# Patient Record
Sex: FEMALE | Race: WHITE | NOT HISPANIC OR LATINO | URBAN - METROPOLITAN AREA
[De-identification: names, ages, dates, MRNs, and addresses within clinical notes are randomized per-mention and may not be internally consistent; named-entity substitution may affect disease eponyms.]

---

## 2017-06-04 ENCOUNTER — EMERGENCY (EMERGENCY)
Facility: HOSPITAL | Age: 67
LOS: 1 days | Discharge: HOME | End: 2017-06-04
Attending: EMERGENCY MEDICINE | Admitting: EMERGENCY MEDICINE
Payer: MEDICARE

## 2017-06-04 VITALS
DIASTOLIC BLOOD PRESSURE: 97 MMHG | SYSTOLIC BLOOD PRESSURE: 210 MMHG | HEIGHT: 64 IN | RESPIRATION RATE: 20 BRPM | OXYGEN SATURATION: 97 % | HEART RATE: 73 BPM | TEMPERATURE: 98 F | WEIGHT: 167.99 LBS

## 2017-06-04 LAB
ALBUMIN SERPL ELPH-MCNC: 4.5 G/DL — SIGNIFICANT CHANGE UP (ref 3.3–5)
ALP SERPL-CCNC: 61 U/L — SIGNIFICANT CHANGE UP (ref 40–120)
ALT FLD-CCNC: 15 U/L — SIGNIFICANT CHANGE UP (ref 4–33)
APPEARANCE UR: SIGNIFICANT CHANGE UP
AST SERPL-CCNC: 20 U/L — SIGNIFICANT CHANGE UP (ref 4–32)
BASOPHILS # BLD AUTO: 0.03 K/UL — SIGNIFICANT CHANGE UP (ref 0–0.2)
BASOPHILS NFR BLD AUTO: 0.5 % — SIGNIFICANT CHANGE UP (ref 0–2)
BILIRUB SERPL-MCNC: 0.6 MG/DL — SIGNIFICANT CHANGE UP (ref 0.2–1.2)
BILIRUB UR-MCNC: NEGATIVE — SIGNIFICANT CHANGE UP
BLOOD UR QL VISUAL: HIGH
BUN SERPL-MCNC: 26 MG/DL — HIGH (ref 7–23)
CALCIUM SERPL-MCNC: 9.8 MG/DL — SIGNIFICANT CHANGE UP (ref 8.4–10.5)
CHLORIDE SERPL-SCNC: 105 MMOL/L — SIGNIFICANT CHANGE UP (ref 98–107)
CO2 SERPL-SCNC: 27 MMOL/L — SIGNIFICANT CHANGE UP (ref 22–31)
COLOR SPEC: SIGNIFICANT CHANGE UP
CREAT SERPL-MCNC: 1.12 MG/DL — SIGNIFICANT CHANGE UP (ref 0.5–1.3)
EOSINOPHIL # BLD AUTO: 0.34 K/UL — SIGNIFICANT CHANGE UP (ref 0–0.5)
EOSINOPHIL NFR BLD AUTO: 5.4 % — SIGNIFICANT CHANGE UP (ref 0–6)
GLUCOSE SERPL-MCNC: 108 MG/DL — HIGH (ref 70–99)
GLUCOSE UR-MCNC: NEGATIVE — SIGNIFICANT CHANGE UP
HCT VFR BLD CALC: 42.7 % — SIGNIFICANT CHANGE UP (ref 34.5–45)
HGB BLD-MCNC: 14.1 G/DL — SIGNIFICANT CHANGE UP (ref 11.5–15.5)
IMM GRANULOCYTES NFR BLD AUTO: 0.3 % — SIGNIFICANT CHANGE UP (ref 0–1.5)
KETONES UR-MCNC: NEGATIVE — SIGNIFICANT CHANGE UP
LEUKOCYTE ESTERASE UR-ACNC: NEGATIVE — SIGNIFICANT CHANGE UP
LYMPHOCYTES # BLD AUTO: 1.13 K/UL — SIGNIFICANT CHANGE UP (ref 1–3.3)
LYMPHOCYTES # BLD AUTO: 17.8 % — SIGNIFICANT CHANGE UP (ref 13–44)
MCHC RBC-ENTMCNC: 29.4 PG — SIGNIFICANT CHANGE UP (ref 27–34)
MCHC RBC-ENTMCNC: 33 % — SIGNIFICANT CHANGE UP (ref 32–36)
MCV RBC AUTO: 89 FL — SIGNIFICANT CHANGE UP (ref 80–100)
MONOCYTES # BLD AUTO: 0.38 K/UL — SIGNIFICANT CHANGE UP (ref 0–0.9)
MONOCYTES NFR BLD AUTO: 6 % — SIGNIFICANT CHANGE UP (ref 2–14)
MUCOUS THREADS # UR AUTO: SIGNIFICANT CHANGE UP
NEUTROPHILS # BLD AUTO: 4.44 K/UL — SIGNIFICANT CHANGE UP (ref 1.8–7.4)
NEUTROPHILS NFR BLD AUTO: 70 % — SIGNIFICANT CHANGE UP (ref 43–77)
NITRITE UR-MCNC: NEGATIVE — SIGNIFICANT CHANGE UP
PH UR: 7 — SIGNIFICANT CHANGE UP (ref 4.6–8)
PLATELET # BLD AUTO: 156 K/UL — SIGNIFICANT CHANGE UP (ref 150–400)
PMV BLD: 12.3 FL — SIGNIFICANT CHANGE UP (ref 7–13)
POTASSIUM SERPL-MCNC: 3.9 MMOL/L — SIGNIFICANT CHANGE UP (ref 3.5–5.3)
POTASSIUM SERPL-SCNC: 3.9 MMOL/L — SIGNIFICANT CHANGE UP (ref 3.5–5.3)
PROT SERPL-MCNC: 7.6 G/DL — SIGNIFICANT CHANGE UP (ref 6–8.3)
PROT UR-MCNC: 20 — SIGNIFICANT CHANGE UP
RBC # BLD: 4.8 M/UL — SIGNIFICANT CHANGE UP (ref 3.8–5.2)
RBC # FLD: 13.5 % — SIGNIFICANT CHANGE UP (ref 10.3–14.5)
RBC CASTS # UR COMP ASSIST: SIGNIFICANT CHANGE UP (ref 0–?)
SODIUM SERPL-SCNC: 146 MMOL/L — HIGH (ref 135–145)
SP GR SPEC: 1.02 — SIGNIFICANT CHANGE UP (ref 1–1.03)
SQUAMOUS # UR AUTO: SIGNIFICANT CHANGE UP
UROBILINOGEN FLD QL: NORMAL E.U. — SIGNIFICANT CHANGE UP (ref 0.1–0.2)
WBC # BLD: 6.34 K/UL — SIGNIFICANT CHANGE UP (ref 3.8–10.5)
WBC # FLD AUTO: 6.34 K/UL — SIGNIFICANT CHANGE UP (ref 3.8–10.5)
WBC UR QL: SIGNIFICANT CHANGE UP (ref 0–?)

## 2017-06-04 PROCEDURE — 99285 EMERGENCY DEPT VISIT HI MDM: CPT | Mod: GC

## 2017-06-04 RX ORDER — ONDANSETRON 8 MG/1
4 TABLET, FILM COATED ORAL ONCE
Qty: 0 | Refills: 0 | Status: COMPLETED | OUTPATIENT
Start: 2017-06-04 | End: 2017-06-04

## 2017-06-04 RX ORDER — ACETAMINOPHEN 500 MG
1000 TABLET ORAL ONCE
Qty: 0 | Refills: 0 | Status: COMPLETED | OUTPATIENT
Start: 2017-06-04 | End: 2017-06-04

## 2017-06-04 RX ORDER — SODIUM CHLORIDE 9 MG/ML
1000 INJECTION INTRAMUSCULAR; INTRAVENOUS; SUBCUTANEOUS ONCE
Qty: 0 | Refills: 0 | Status: COMPLETED | OUTPATIENT
Start: 2017-06-04 | End: 2017-06-04

## 2017-06-04 RX ORDER — MORPHINE SULFATE 50 MG/1
4 CAPSULE, EXTENDED RELEASE ORAL ONCE
Qty: 0 | Refills: 0 | Status: DISCONTINUED | OUTPATIENT
Start: 2017-06-04 | End: 2017-06-04

## 2017-06-04 RX ADMIN — Medication 400 MILLIGRAM(S): at 22:00

## 2017-06-04 RX ADMIN — SODIUM CHLORIDE 4000 MILLILITER(S): 9 INJECTION INTRAMUSCULAR; INTRAVENOUS; SUBCUTANEOUS at 21:52

## 2017-06-04 RX ADMIN — Medication 1000 MILLIGRAM(S): at 22:21

## 2017-06-04 RX ADMIN — ONDANSETRON 4 MILLIGRAM(S): 8 TABLET, FILM COATED ORAL at 21:52

## 2017-06-04 RX ADMIN — MORPHINE SULFATE 4 MILLIGRAM(S): 50 CAPSULE, EXTENDED RELEASE ORAL at 22:21

## 2017-06-04 NOTE — ED ADULT TRIAGE NOTE - CHIEF COMPLAINT QUOTE
left flank pain    pt c/o left flank pain for 1 day, nausea, vomited x2 in triage. appers very uncomfortable in triage

## 2017-06-04 NOTE — ED PROVIDER NOTE - ATTENDING CONTRIBUTION TO CARE
I, Nancy Kan M.D. have examined the patient and confirmed the essential components of the history, physical examination, diagnosis, and treatment plan. I agree with the patient's care as documented by the resident and amended herein by me. See note above for complete details of service.  66 yo F Hx HTN, Renal Stones who pw L flank pain intermittent x 5 days. Pt has had one episode of hematuria since symptom onset. No fever. + Chills, nausea NBNB vomiting when symptoms worsen. On exam pt appears uncomfortable. Exam reveals + L CVAT, abdomen benign. Plan labs, UA, CT [place]. Symptom control. Supportive measures. Reassess.

## 2017-06-04 NOTE — ED ADULT NURSE NOTE - OBJECTIVE STATEMENT
Pt received in spot 13, A&Ox3, NAD.  c/o L flank pain starting yesterday with dysuria and increased pressure after urinating for approx 1 week.  Pt endorses nausea/vomiting.  Denies any other physical complaints.  Pt appears uncomfortable.  Labs/UA sent.  Will continue to monitor.

## 2017-06-04 NOTE — ED PROVIDER NOTE - OBJECTIVE STATEMENT
67 year old female with no significant past medical history in with left flank pain x 1 week, worsening, no alleviating or aggravating factors, associated with nausea, vomiting, dysuria, and one episode of hematuria 5 days ago that resolved.   Denies fever, abdominal pain.

## 2017-06-04 NOTE — ED PROVIDER NOTE - MEDICAL DECISION MAKING DETAILS
67 year old female in with flank pain, dysuria, cbc, cmp, iv fluids, iv tylenol, zofran, ct, reassess dc home, 67 year old female in with flank pain, dysuria, cbc, cmp, iv fluids, iv Tylenol, Zofran, ct, reassess dc home,

## 2017-06-05 VITALS
HEART RATE: 67 BPM | OXYGEN SATURATION: 100 % | DIASTOLIC BLOOD PRESSURE: 94 MMHG | TEMPERATURE: 98 F | SYSTOLIC BLOOD PRESSURE: 179 MMHG | RESPIRATION RATE: 16 BRPM

## 2017-06-05 PROCEDURE — 74176 CT ABD & PELVIS W/O CONTRAST: CPT | Mod: 26

## 2017-06-05 RX ORDER — OXYCODONE HYDROCHLORIDE 5 MG/1
1 TABLET ORAL
Qty: 8 | Refills: 0
Start: 2017-06-05 | End: 2017-06-07

## 2017-06-05 RX ORDER — OXYCODONE HYDROCHLORIDE 5 MG/1
1 TABLET ORAL
Qty: 15 | Refills: 0
Start: 2017-06-05

## 2017-06-05 RX ORDER — TAMSULOSIN HYDROCHLORIDE 0.4 MG/1
1 CAPSULE ORAL
Qty: 14 | Refills: 0
Start: 2017-06-05 | End: 2017-06-19

## 2017-06-05 RX ORDER — OXYCODONE HYDROCHLORIDE 5 MG/1
1 TABLET ORAL
Qty: 15 | Refills: 0 | OUTPATIENT
Start: 2017-06-05

## 2017-06-05 RX ADMIN — MORPHINE SULFATE 4 MILLIGRAM(S): 50 CAPSULE, EXTENDED RELEASE ORAL at 00:04

## 2017-06-05 NOTE — ED ADULT NURSE REASSESSMENT NOTE - NS ED NURSE REASSESS COMMENT FT1
rec'd pt. a&ox3, appears in NAD, breathes with ease, c/o mild Left lower back pain radiating to LLQ. denies any n/v. BP elevated, denies any h/a dizziness. MD made aware. NS infusing via g20 saline lock via left ac with no ss of infiltration. awaits results and dispo. son at bedside. will continue to monitor

## 2017-06-07 ENCOUNTER — MEDICATION RENEWAL (OUTPATIENT)
Age: 67
End: 2017-06-07

## 2017-07-26 ENCOUNTER — APPOINTMENT (OUTPATIENT)
Dept: INTERNAL MEDICINE | Facility: CLINIC | Age: 67
End: 2017-07-26

## 2017-07-26 DIAGNOSIS — E55.9 VITAMIN D DEFICIENCY, UNSPECIFIED: ICD-10-CM

## 2017-08-07 LAB
25(OH)D3 SERPL-MCNC: 55.2 NG/ML
ALBUMIN SERPL ELPH-MCNC: 4.4 G/DL
ALP BLD-CCNC: 62 U/L
ALT SERPL-CCNC: 17 U/L
ANION GAP SERPL CALC-SCNC: 13 MMOL/L
AST SERPL-CCNC: 17 U/L
BILIRUB SERPL-MCNC: 0.5 MG/DL
BUN SERPL-MCNC: 22 MG/DL
CALCIUM SERPL-MCNC: 9.9 MG/DL
CHLORIDE SERPL-SCNC: 103 MMOL/L
CO2 SERPL-SCNC: 28 MMOL/L
CREAT SERPL-MCNC: 0.77 MG/DL
GLUCOSE SERPL-MCNC: 86 MG/DL
POTASSIUM SERPL-SCNC: 4.3 MMOL/L
PROT SERPL-MCNC: 7.4 G/DL
SAVE SPECIMEN: NORMAL
SODIUM SERPL-SCNC: 144 MMOL/L

## 2017-08-09 ENCOUNTER — APPOINTMENT (OUTPATIENT)
Dept: INTERNAL MEDICINE | Facility: CLINIC | Age: 67
End: 2017-08-09
Payer: MEDICARE

## 2017-08-09 VITALS
WEIGHT: 167 LBS | HEIGHT: 64 IN | TEMPERATURE: 98.6 F | SYSTOLIC BLOOD PRESSURE: 130 MMHG | DIASTOLIC BLOOD PRESSURE: 84 MMHG | BODY MASS INDEX: 28.51 KG/M2

## 2017-08-09 PROCEDURE — 99213 OFFICE O/P EST LOW 20 MIN: CPT | Mod: 25

## 2017-08-09 PROCEDURE — 96372 THER/PROPH/DIAG INJ SC/IM: CPT

## 2019-01-28 ENCOUNTER — APPOINTMENT (OUTPATIENT)
Dept: INTERNAL MEDICINE | Facility: CLINIC | Age: 69
End: 2019-01-28

## 2019-01-30 ENCOUNTER — APPOINTMENT (OUTPATIENT)
Dept: INTERNAL MEDICINE | Facility: CLINIC | Age: 69
End: 2019-01-30
Payer: MEDICARE

## 2019-01-30 PROCEDURE — 36415 COLL VENOUS BLD VENIPUNCTURE: CPT

## 2019-01-31 LAB
25(OH)D3 SERPL-MCNC: 53.2 NG/ML
ALBUMIN SERPL ELPH-MCNC: 4.1 G/DL
ALP BLD-CCNC: 61 U/L
ALT SERPL-CCNC: 15 U/L
ANION GAP SERPL CALC-SCNC: 11 MMOL/L
AST SERPL-CCNC: 17 U/L
BILIRUB SERPL-MCNC: 0.6 MG/DL
BUN SERPL-MCNC: 22 MG/DL
CALCIUM SERPL-MCNC: 9.7 MG/DL
CHLORIDE SERPL-SCNC: 103 MMOL/L
CO2 SERPL-SCNC: 28 MMOL/L
CREAT SERPL-MCNC: 0.82 MG/DL
GLUCOSE SERPL-MCNC: 101 MG/DL
POTASSIUM SERPL-SCNC: 3.8 MMOL/L
PROT SERPL-MCNC: 6.6 G/DL
SAVE SPECIMEN: NORMAL
SODIUM SERPL-SCNC: 142 MMOL/L

## 2019-02-11 ENCOUNTER — APPOINTMENT (OUTPATIENT)
Dept: INTERNAL MEDICINE | Facility: CLINIC | Age: 69
End: 2019-02-11

## 2019-02-13 ENCOUNTER — APPOINTMENT (OUTPATIENT)
Dept: INTERNAL MEDICINE | Facility: CLINIC | Age: 69
End: 2019-02-13
Payer: MEDICARE

## 2019-02-13 VITALS
WEIGHT: 161 LBS | TEMPERATURE: 97.5 F | SYSTOLIC BLOOD PRESSURE: 130 MMHG | HEIGHT: 64 IN | BODY MASS INDEX: 27.49 KG/M2 | DIASTOLIC BLOOD PRESSURE: 70 MMHG

## 2019-02-13 PROCEDURE — 77080 DXA BONE DENSITY AXIAL: CPT | Mod: GA

## 2019-02-13 PROCEDURE — 99214 OFFICE O/P EST MOD 30 MIN: CPT | Mod: 25

## 2019-02-13 PROCEDURE — 96372 THER/PROPH/DIAG INJ SC/IM: CPT

## 2019-02-13 RX ORDER — DENOSUMAB 60 MG/ML
60 INJECTION SUBCUTANEOUS
Qty: 1 | Refills: 0 | Status: COMPLETED | OUTPATIENT
Start: 2019-02-13

## 2019-02-13 RX ADMIN — DENOSUMAB 0 MG/ML: 60 INJECTION SUBCUTANEOUS at 00:00

## 2019-02-13 NOTE — DATA REVIEWED
[FreeTextEntry1] : DEXA today revealed T scores were\par -2.7 in Lspine and -2.9 in femoral neck of the hips\par in comparison the T scores were\par -2.7 and -2.5 respectively in 7/27/2017

## 2019-02-13 NOTE — PHYSICAL EXAM
[No Acute Distress] : no acute distress [Well Nourished] : well nourished [Well Developed] : well developed [Well-Appearing] : well-appearing [No CVA Tenderness] : no CVA  tenderness [No Spinal Tenderness] : no spinal tenderness [No Joint Swelling] : no joint swelling [Grossly Normal Strength/Tone] : grossly normal strength/tone [Normal Gait] : normal gait [Coordination Grossly Intact] : coordination grossly intact [Normal Affect] : the affect was normal [Normal Mood] : the mood was normal

## 2019-02-13 NOTE — HISTORY OF PRESENT ILLNESS
[FreeTextEntry1] : pt presents to discuss results of recent DEXA  for f/u for treatment options for osteoporosis and  her 6th Prolia shot.\par She did not have any treatment for osteoporosis in 2018

## 2019-08-21 DIAGNOSIS — Z00.00 ENCOUNTER FOR GENERAL ADULT MEDICAL EXAMINATION W/OUT ABNORMAL FINDINGS: ICD-10-CM

## 2019-08-28 ENCOUNTER — APPOINTMENT (OUTPATIENT)
Dept: INTERNAL MEDICINE | Facility: CLINIC | Age: 69
End: 2019-08-28
Payer: MEDICARE

## 2019-08-28 PROCEDURE — 36415 COLL VENOUS BLD VENIPUNCTURE: CPT

## 2019-08-29 LAB
ALBUMIN SERPL ELPH-MCNC: 4.1 G/DL
ALP BLD-CCNC: 52 U/L
ALT SERPL-CCNC: 15 U/L
ANION GAP SERPL CALC-SCNC: 11 MMOL/L
AST SERPL-CCNC: 17 U/L
BILIRUB SERPL-MCNC: 0.6 MG/DL
BUN SERPL-MCNC: 21 MG/DL
CALCIUM SERPL-MCNC: 9.6 MG/DL
CHLORIDE SERPL-SCNC: 102 MMOL/L
CO2 SERPL-SCNC: 28 MMOL/L
CREAT SERPL-MCNC: 0.66 MG/DL
GLUCOSE SERPL-MCNC: 104 MG/DL
POTASSIUM SERPL-SCNC: 3.7 MMOL/L
PROT SERPL-MCNC: 6.6 G/DL
SAVE SPECIMEN: NORMAL
SODIUM SERPL-SCNC: 141 MMOL/L

## 2019-09-13 ENCOUNTER — APPOINTMENT (OUTPATIENT)
Dept: INTERNAL MEDICINE | Facility: CLINIC | Age: 69
End: 2019-09-13
Payer: MEDICARE

## 2019-09-13 VITALS
WEIGHT: 165 LBS | BODY MASS INDEX: 28.17 KG/M2 | HEIGHT: 64 IN | SYSTOLIC BLOOD PRESSURE: 158 MMHG | TEMPERATURE: 97.3 F | DIASTOLIC BLOOD PRESSURE: 86 MMHG

## 2019-09-13 DIAGNOSIS — Z09 ENCOUNTER FOR FOLLOW-UP EXAMINATION AFTER COMPLETED TREATMENT FOR CONDITIONS OTHER THAN MALIGNANT NEOPLASM: ICD-10-CM

## 2019-09-13 DIAGNOSIS — M81.0 ENCOUNTER FOR FOLLOW-UP EXAMINATION AFTER COMPLETED TREATMENT FOR CONDITIONS OTHER THAN MALIGNANT NEOPLASM: ICD-10-CM

## 2019-09-13 PROCEDURE — 96372 THER/PROPH/DIAG INJ SC/IM: CPT

## 2020-02-27 ENCOUNTER — APPOINTMENT (OUTPATIENT)
Dept: ORTHOPEDIC SURGERY | Facility: CLINIC | Age: 70
End: 2020-02-27
Payer: MEDICARE

## 2020-02-27 DIAGNOSIS — M75.21 BICIPITAL TENDINITIS, RIGHT SHOULDER: ICD-10-CM

## 2020-02-27 DIAGNOSIS — S46.019A STRAIN OF MUSCLE(S) AND TENDON(S) OF THE ROTATOR CUFF OF UNSPECIFIED SHOULDER, INITIAL ENCOUNTER: ICD-10-CM

## 2020-02-27 DIAGNOSIS — Z80.9 FAMILY HISTORY OF MALIGNANT NEOPLASM, UNSPECIFIED: ICD-10-CM

## 2020-02-27 DIAGNOSIS — Z87.39 PERSONAL HISTORY OF OTHER DISEASES OF THE MUSCULOSKELETAL SYSTEM AND CONNECTIVE TISSUE: ICD-10-CM

## 2020-02-27 DIAGNOSIS — Z86.79 PERSONAL HISTORY OF OTHER DISEASES OF THE CIRCULATORY SYSTEM: ICD-10-CM

## 2020-02-27 DIAGNOSIS — Z82.62 FAMILY HISTORY OF OSTEOPOROSIS: ICD-10-CM

## 2020-02-27 PROCEDURE — 99203 OFFICE O/P NEW LOW 30 MIN: CPT

## 2020-02-27 RX ORDER — PREDNISONE 20 MG/1
20 TABLET ORAL
Qty: 14 | Refills: 0 | Status: DISCONTINUED | COMMUNITY
Start: 2019-11-27

## 2020-02-27 RX ORDER — METHYLPREDNISOLONE 4 MG/1
4 TABLET ORAL
Qty: 21 | Refills: 0 | Status: DISCONTINUED | COMMUNITY
Start: 2019-12-20

## 2020-02-27 RX ORDER — HYDROCHLOROTHIAZIDE 25 MG/1
25 TABLET ORAL
Qty: 30 | Refills: 0 | Status: ACTIVE | COMMUNITY
Start: 2019-10-30

## 2020-02-27 RX ORDER — SODIUM SULFATE, POTASSIUM SULFATE, MAGNESIUM SULFATE 17.5; 3.13; 1.6 G/ML; G/ML; G/ML
17.5-3.13-1.6 SOLUTION, CONCENTRATE ORAL
Qty: 354 | Refills: 0 | Status: DISCONTINUED | COMMUNITY
Start: 2019-10-11

## 2020-03-03 NOTE — ADDENDUM
[FreeTextEntry1] : I, Katie Georges wrote this note acting as a scribe for Dr. Hardeep Millard on Feb 27, 2020.

## 2020-03-03 NOTE — PHYSICAL EXAM
[de-identified] : Patient is WDWN, alert, and in no acute distress. Breathing is unlabored. She is grossly oriented to person, place, and time. \par \par Right Shoulder: \par Inspection/ Palpation: there is no tenderness, swelling, or deformities. \par Range of Motion: active flexion, abduction, internal and external rotation are full and painless in all planes with no crepitus.\par Strength: forward elevation, internal rotation, external rotation, adduction, and abduction are 5/5. \par Stability: no joint instability on provocative testing. \par \par Right hand: There is A1 pulley tenderness in the long finger. Full arc of motion in the fingers, and all intrinsic and extrinsic hand muscles 5/5. No joint instability on provocative testing, sensation is intact to light touch, and no skin lesions or discoloration.  [de-identified] : MRI right shoulder on 02/19/2020 at [facility]: Acute rotator cuff tendon rupture with completely rupture subscapularis, essentially complete disruption supraspinatus and high-grade incomplete tearing infraspinatus. Concomitant anterior glenohumeral capsular rupture. Joint effusion with extension to the subacromial subdeltoid bursa. High-grade tearing of the long head biceps. \par \par No acute fracture. Mild to moderate acromioclavicular and mild glenohumeral degenerative chondrosis.

## 2020-03-03 NOTE — END OF VISIT
[FreeTextEntry3] : I, Hardeep Millard MD, ordering physician, have read and attest that all the information, medical decision making and discharge instructions within are true and accurate.

## 2020-04-20 ENCOUNTER — APPOINTMENT (OUTPATIENT)
Dept: NEUROLOGY | Facility: CLINIC | Age: 70
End: 2020-04-20

## 2020-08-31 ENCOUNTER — APPOINTMENT (OUTPATIENT)
Dept: ORTHOPEDIC SURGERY | Facility: CLINIC | Age: 70
End: 2020-08-31
Payer: MEDICARE

## 2020-08-31 DIAGNOSIS — M23.91 UNSPECIFIED INTERNAL DERANGEMENT OF RIGHT KNEE: ICD-10-CM

## 2020-08-31 PROCEDURE — 99213 OFFICE O/P EST LOW 20 MIN: CPT

## 2020-08-31 NOTE — PHYSICAL EXAM
[de-identified] : Patient is WDWN, alert, and in no acute distress. Breathing is unlabored. She is grossly oriented to person, place, and time. \par \par Right Shoulder: \par Inspection/ Palpation: there is diffuse tenderness, mild swelling, no deformities. \par Range of Motion: active flexion, abduction, internal and external rotation are full and painless in all planes with mild crepitus.\par Strength: forward elevation, internal rotation, external rotation, adduction, and abduction are 5/5. \par Stability: no joint instability on provocative testing. \par \par   [de-identified] : MRI right shoulder on 02/19/2020 at [facility]: Acute rotator cuff tendon rupture with completely rupture subscapularis, essentially complete disruption supraspinatus and high-grade incomplete tearing infraspinatus. Concomitant anterior glenohumeral capsular rupture. Joint effusion with extension to the subacromial subdeltoid bursa. High-grade tearing of the long head biceps. \par \par No acute fracture. Mild to moderate acromioclavicular and mild glenohumeral degenerative chondrosis. \par \par Xray of the right knee obtained at [facility] on 08/27/2020 were reviewed and interpreted in office today 08/31/2020 by Dr. Millard and revealed that there is no fracture or malalignment of the right knee. Mild tricompartmental osteoarthritis. 1.1 cm calcific body projecting over the medial femoral condylar margin, which may represent a loose body within the medial gutter. Small nonspecific knee joint effusion. superior patellar enthesopathic change. Regional soft tissues within normal limits. Signed by Attending Ave Pereira MD. \par \par \par MRI of the right knee obtained at [facility] on 08/28/2020 were reviewed and interpreted in office today 08/31/2020 by Dr. Millard and revealed lateral meniscus body and anterior horn complex tear with predominant horizontal component. No parameniscal cyst. Tricompartmental articular cartilage degeneration, most prominent in the lateral and patellofemoral compartments. Moderate knee joint effusion mild synovitis. Moderate sized popliteal cyst measuring up to 5.7 cm with internal septation debris. Evidence of ruptured popliteal cyst characterized by fluid seen tracking superficial to the medial head of the gastrocnemius muscle. 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 Correlation can be made for point tenderness in this area. Signed by Attending Erlinda Smart MD. \par

## 2020-08-31 NOTE — ADDENDUM
[FreeTextEntry1] : I, Talita Pugh wrote this note acting as a scribe for Dr. Hardeep Millard on Aug 31, 2020.

## 2020-08-31 NOTE — DISCUSSION/SUMMARY
[de-identified] : The patient wishes to proceed with physical therapy for the right knee. A script was given. \par The option for a cortisone injection was discussed. \par Patient was advised that there is no need for surgery. \par Topical analgesics as needed. \par NSAIDs as tolerated. \par Walking and physical activity were encouraged. \par Follow Up in 6 weeks. \par

## 2020-08-31 NOTE — HISTORY OF PRESENT ILLNESS
[de-identified] : Patient is a 70 year old female who presents with right knee pain. She states that she was carrying heavy objects when she was going up and down the stairs when she began to fell pain. Denies injury. She states that she feels pain around her knee. \par

## 2020-11-23 ENCOUNTER — INPATIENT (INPATIENT)
Facility: HOSPITAL | Age: 70
LOS: 0 days | Discharge: HOME | End: 2020-11-24
Attending: INTERNAL MEDICINE | Admitting: INTERNAL MEDICINE
Payer: MEDICARE

## 2020-11-23 VITALS
TEMPERATURE: 98 F | HEIGHT: 64 IN | WEIGHT: 158.73 LBS | SYSTOLIC BLOOD PRESSURE: 181 MMHG | OXYGEN SATURATION: 100 % | RESPIRATION RATE: 16 BRPM | HEART RATE: 82 BPM | DIASTOLIC BLOOD PRESSURE: 101 MMHG

## 2020-11-23 DIAGNOSIS — C49.A0 GASTROINTESTINAL STROMAL TUMOR, UNSPECIFIED SITE: ICD-10-CM

## 2020-11-23 DIAGNOSIS — Z29.9 ENCOUNTER FOR PROPHYLACTIC MEASURES, UNSPECIFIED: ICD-10-CM

## 2020-11-23 DIAGNOSIS — I10 ESSENTIAL (PRIMARY) HYPERTENSION: ICD-10-CM

## 2020-11-23 DIAGNOSIS — I48.91 UNSPECIFIED ATRIAL FIBRILLATION: ICD-10-CM

## 2020-11-23 DIAGNOSIS — F41.9 ANXIETY DISORDER, UNSPECIFIED: ICD-10-CM

## 2020-11-23 DIAGNOSIS — R07.9 CHEST PAIN, UNSPECIFIED: ICD-10-CM

## 2020-11-23 LAB
ALBUMIN SERPL ELPH-MCNC: 4.5 G/DL — SIGNIFICANT CHANGE UP (ref 3.3–5)
ALP SERPL-CCNC: 69 U/L — SIGNIFICANT CHANGE UP (ref 40–120)
ALT FLD-CCNC: 14 U/L — SIGNIFICANT CHANGE UP (ref 4–33)
ANION GAP SERPL CALC-SCNC: 11 MMO/L — SIGNIFICANT CHANGE UP (ref 7–14)
APTT BLD: 30.1 SEC — SIGNIFICANT CHANGE UP (ref 27–36.3)
APTT BLD: > 200 SEC — CRITICAL HIGH (ref 27–36.3)
AST SERPL-CCNC: 20 U/L — SIGNIFICANT CHANGE UP (ref 4–32)
BASOPHILS # BLD AUTO: 0.05 K/UL — SIGNIFICANT CHANGE UP (ref 0–0.2)
BASOPHILS NFR BLD AUTO: 0.8 % — SIGNIFICANT CHANGE UP (ref 0–2)
BILIRUB SERPL-MCNC: 0.9 MG/DL — SIGNIFICANT CHANGE UP (ref 0.2–1.2)
BUN SERPL-MCNC: 14 MG/DL — SIGNIFICANT CHANGE UP (ref 7–23)
CALCIUM SERPL-MCNC: 10.2 MG/DL — SIGNIFICANT CHANGE UP (ref 8.4–10.5)
CHLORIDE SERPL-SCNC: 103 MMOL/L — SIGNIFICANT CHANGE UP (ref 98–107)
CK SERPL-CCNC: 48 U/L — SIGNIFICANT CHANGE UP (ref 25–170)
CO2 SERPL-SCNC: 27 MMOL/L — SIGNIFICANT CHANGE UP (ref 22–31)
CREAT SERPL-MCNC: 0.57 MG/DL — SIGNIFICANT CHANGE UP (ref 0.5–1.3)
EOSINOPHIL # BLD AUTO: 0.19 K/UL — SIGNIFICANT CHANGE UP (ref 0–0.5)
EOSINOPHIL NFR BLD AUTO: 3 % — SIGNIFICANT CHANGE UP (ref 0–6)
GLUCOSE SERPL-MCNC: 109 MG/DL — HIGH (ref 70–99)
HCT VFR BLD CALC: 40.5 % — SIGNIFICANT CHANGE UP (ref 34.5–45)
HCT VFR BLD CALC: 44.1 % — SIGNIFICANT CHANGE UP (ref 34.5–45)
HGB BLD-MCNC: 13.7 G/DL — SIGNIFICANT CHANGE UP (ref 11.5–15.5)
HGB BLD-MCNC: 14.9 G/DL — SIGNIFICANT CHANGE UP (ref 11.5–15.5)
IMM GRANULOCYTES NFR BLD AUTO: 0.5 % — SIGNIFICANT CHANGE UP (ref 0–1.5)
INR BLD: 0.97 — SIGNIFICANT CHANGE UP (ref 0.88–1.16)
LYMPHOCYTES # BLD AUTO: 1.02 K/UL — SIGNIFICANT CHANGE UP (ref 1–3.3)
LYMPHOCYTES # BLD AUTO: 15.9 % — SIGNIFICANT CHANGE UP (ref 13–44)
MCHC RBC-ENTMCNC: 29.2 PG — SIGNIFICANT CHANGE UP (ref 27–34)
MCHC RBC-ENTMCNC: 29.4 PG — SIGNIFICANT CHANGE UP (ref 27–34)
MCHC RBC-ENTMCNC: 33.8 % — SIGNIFICANT CHANGE UP (ref 32–36)
MCHC RBC-ENTMCNC: 33.8 % — SIGNIFICANT CHANGE UP (ref 32–36)
MCV RBC AUTO: 86.4 FL — SIGNIFICANT CHANGE UP (ref 80–100)
MCV RBC AUTO: 87 FL — SIGNIFICANT CHANGE UP (ref 80–100)
MONOCYTES # BLD AUTO: 0.3 K/UL — SIGNIFICANT CHANGE UP (ref 0–0.9)
MONOCYTES NFR BLD AUTO: 4.7 % — SIGNIFICANT CHANGE UP (ref 2–14)
NEUTROPHILS # BLD AUTO: 4.83 K/UL — SIGNIFICANT CHANGE UP (ref 1.8–7.4)
NEUTROPHILS NFR BLD AUTO: 75.1 % — SIGNIFICANT CHANGE UP (ref 43–77)
NRBC # FLD: 0 K/UL — SIGNIFICANT CHANGE UP (ref 0–0)
NRBC # FLD: 0 K/UL — SIGNIFICANT CHANGE UP (ref 0–0)
PLATELET # BLD AUTO: 137 K/UL — LOW (ref 150–400)
PLATELET # BLD AUTO: 143 K/UL — LOW (ref 150–400)
PMV BLD: 12.2 FL — SIGNIFICANT CHANGE UP (ref 7–13)
PMV BLD: 12.4 FL — SIGNIFICANT CHANGE UP (ref 7–13)
POTASSIUM SERPL-MCNC: 3.5 MMOL/L — SIGNIFICANT CHANGE UP (ref 3.5–5.3)
POTASSIUM SERPL-SCNC: 3.5 MMOL/L — SIGNIFICANT CHANGE UP (ref 3.5–5.3)
PROT SERPL-MCNC: 7.1 G/DL — SIGNIFICANT CHANGE UP (ref 6–8.3)
PROTHROM AB SERPL-ACNC: 11.2 SEC — SIGNIFICANT CHANGE UP (ref 10.6–13.6)
RBC # BLD: 4.69 M/UL — SIGNIFICANT CHANGE UP (ref 3.8–5.2)
RBC # BLD: 5.07 M/UL — SIGNIFICANT CHANGE UP (ref 3.8–5.2)
RBC # FLD: 13.3 % — SIGNIFICANT CHANGE UP (ref 10.3–14.5)
RBC # FLD: 13.5 % — SIGNIFICANT CHANGE UP (ref 10.3–14.5)
SARS-COV-2 RNA SPEC QL NAA+PROBE: SIGNIFICANT CHANGE UP
SODIUM SERPL-SCNC: 141 MMOL/L — SIGNIFICANT CHANGE UP (ref 135–145)
TROPONIN T, HIGH SENSITIVITY: 50 NG/L — SIGNIFICANT CHANGE UP (ref ?–14)
TROPONIN T, HIGH SENSITIVITY: 68 NG/L — CRITICAL HIGH (ref ?–14)
TROPONIN T, HIGH SENSITIVITY: 70 NG/L — CRITICAL HIGH (ref ?–14)
WBC # BLD: 5.65 K/UL — SIGNIFICANT CHANGE UP (ref 3.8–10.5)
WBC # BLD: 6.42 K/UL — SIGNIFICANT CHANGE UP (ref 3.8–10.5)
WBC # FLD AUTO: 5.65 K/UL — SIGNIFICANT CHANGE UP (ref 3.8–10.5)
WBC # FLD AUTO: 6.42 K/UL — SIGNIFICANT CHANGE UP (ref 3.8–10.5)

## 2020-11-23 PROCEDURE — 99223 1ST HOSP IP/OBS HIGH 75: CPT

## 2020-11-23 PROCEDURE — 71046 X-RAY EXAM CHEST 2 VIEWS: CPT | Mod: 26

## 2020-11-23 PROCEDURE — 99284 EMERGENCY DEPT VISIT MOD MDM: CPT

## 2020-11-23 RX ORDER — HEPARIN SODIUM 5000 [USP'U]/ML
3000 INJECTION INTRAVENOUS; SUBCUTANEOUS EVERY 6 HOURS
Refills: 0 | Status: DISCONTINUED | OUTPATIENT
Start: 2020-11-23 | End: 2020-11-24

## 2020-11-23 RX ORDER — INFLUENZA VIRUS VACCINE 15; 15; 15; 15 UG/.5ML; UG/.5ML; UG/.5ML; UG/.5ML
0.5 SUSPENSION INTRAMUSCULAR ONCE
Refills: 0 | Status: DISCONTINUED | OUTPATIENT
Start: 2020-11-23 | End: 2020-11-24

## 2020-11-23 RX ORDER — LOSARTAN POTASSIUM 100 MG/1
1 TABLET, FILM COATED ORAL
Qty: 0 | Refills: 0 | DISCHARGE

## 2020-11-23 RX ORDER — ACETAMINOPHEN 500 MG
650 TABLET ORAL ONCE
Refills: 0 | Status: COMPLETED | OUTPATIENT
Start: 2020-11-23 | End: 2020-11-23

## 2020-11-23 RX ORDER — HEPARIN SODIUM 5000 [USP'U]/ML
6000 INJECTION INTRAVENOUS; SUBCUTANEOUS EVERY 6 HOURS
Refills: 0 | Status: DISCONTINUED | OUTPATIENT
Start: 2020-11-23 | End: 2020-11-24

## 2020-11-23 RX ORDER — ACETAMINOPHEN 500 MG
650 TABLET ORAL EVERY 6 HOURS
Refills: 0 | Status: DISCONTINUED | OUTPATIENT
Start: 2020-11-23 | End: 2020-11-24

## 2020-11-23 RX ORDER — LOSARTAN POTASSIUM 100 MG/1
100 TABLET, FILM COATED ORAL DAILY
Refills: 0 | Status: DISCONTINUED | OUTPATIENT
Start: 2020-11-23 | End: 2020-11-24

## 2020-11-23 RX ORDER — HEPARIN SODIUM 5000 [USP'U]/ML
INJECTION INTRAVENOUS; SUBCUTANEOUS
Qty: 25000 | Refills: 0 | Status: DISCONTINUED | OUTPATIENT
Start: 2020-11-23 | End: 2020-11-24

## 2020-11-23 RX ORDER — HEPARIN SODIUM 5000 [USP'U]/ML
6000 INJECTION INTRAVENOUS; SUBCUTANEOUS ONCE
Refills: 0 | Status: COMPLETED | OUTPATIENT
Start: 2020-11-23 | End: 2020-11-23

## 2020-11-23 RX ADMIN — Medication 650 MILLIGRAM(S): at 21:45

## 2020-11-23 RX ADMIN — HEPARIN SODIUM 6000 UNIT(S): 5000 INJECTION INTRAVENOUS; SUBCUTANEOUS at 15:53

## 2020-11-23 RX ADMIN — HEPARIN SODIUM 1100 UNIT(S)/HR: 5000 INJECTION INTRAVENOUS; SUBCUTANEOUS at 23:27

## 2020-11-23 RX ADMIN — HEPARIN SODIUM 0 UNIT(S)/HR: 5000 INJECTION INTRAVENOUS; SUBCUTANEOUS at 22:24

## 2020-11-23 RX ADMIN — Medication 650 MILLIGRAM(S): at 22:45

## 2020-11-23 RX ADMIN — HEPARIN SODIUM 1300 UNIT(S)/HR: 5000 INJECTION INTRAVENOUS; SUBCUTANEOUS at 15:53

## 2020-11-23 NOTE — ED PROVIDER NOTE - OBJECTIVE STATEMENT
70F with PMH of HTN p/w sub-sternal chest pain with radiation to the left arm and jaw. Associated with tingling in the left arm. No history of heart problems or similar symptoms. Has been in her usual state of health recently. Now mostly resolved. Noted to be in new Afib on arrival to ED.

## 2020-11-23 NOTE — CONSULT NOTE ADULT - SUBJECTIVE AND OBJECTIVE BOX
CHIEF COMPLAINT:  chest pain palpitations afib rapid VR  HISTORY OF PRESENT ILLNESS:  HPI:  The patient is a 71 yo woman with PMH of GIST, osteoperosis, HTN presenting with chest pain and new onset atrial fibrillation. Patient reports being in usual state of health last night. This morning around 830 AM developed palpitations followed by L sided chest pain described as burning and squeezing w/ associated dyspnea. Pain radiated to jaw and down L arm w/ numbness and tingling. Pt also reports diaphoresis and nausea this AM. . Pt  was found to be in new onset atrial fibrillation here. Pt denies every having similar chest pain/palpitations  Reports stress and sleep deprivation in last 6 weeks in setting of passing of her sister. Last stress and echo 2 yrs prior without abnormalities, in outpatient office, pt w/ reports. No prior PCI/CABG/Valvular repair. Denies fevers, chills, URI symptoms, emesis. Otherwise tolerating po intake.Patient on admission hy[pertensive ecg NSR LVH with repoarization    I  Labs: Troponin 50->70  EKG: On arrival afib w/ RVR to 152, now EKG NSR known TWI in II, V4, V5, V6, aVL, compared to EKG 2017. No KRYSTA/STD/Q waves.     PAST MEDICAL & SURGICAL HISTORY:  Gastrointestinal stromal tumor (GIST)  first resection 20 years prior, last 4 years prior          MEDICATIONS:  heparin   Injectable 3000 Unit(s) IV Push every 6 hours PRN  heparin   Injectable 6000 Unit(s) IV Push every 6 hours PRN  heparin  Infusion.  Unit(s)/Hr IV Continuous <Continuous>  losartan 100 milliGRAM(s) Oral daily        acetaminophen   Tablet .. 650 milliGRAM(s) Oral every 6 hours PRN        influenza   Vaccine 0.5 milliLiter(s) IntraMuscular once      FAMILY HISTORY:      SOCIAL HISTORY:    [ ] Non-smoker  [ ] Smoker  [ ] Alcohol    Allergies    No Known Allergies    Intolerances      PHYSICAL EXAM:  T(C): 36.4 (11-23-20 @ 20:17), Max: 37 (11-23-20 @ 13:56)  HR: 57 (11-23-20 @ 21:22) (57 - 82)  BP: 175/73 (11-23-20 @ 21:22) (163/97 - 199/102)  RR: 18 (11-23-20 @ 20:17) (14 - 18)  SpO2: 100% (11-23-20 @ 20:17) (100% - 100%)  Wt(kg): --  I&O's Summary      Appearance: Normal anxious NAD	  HEENT:   Normal oral mucosa, PERRL, EOMI	  Cardiovascular: Normal S1 S2, No JVD, No murmurs  Respiratory: Lungs clear to auscultation	  Psychiatry: A & O x 3, Mood & affect appropriate  Gastrointestinal:  Soft, Non-tender, + BS	  Skin: No rashes, No ecchymoses, No cyanosis	  Neurologic: Non-focal  Extremities: No clubbing, cyanosis or edema  Vascular: Peripheral pulses palpable 2+ bilaterally    TELEMETRY: 	    ECG:  	NSR  LVH with repoarization  RADIOLOGY:  OTHER: 	  	  LABS:	 	    CARDIAC MARKERS:      tropon  Troponin T, High Sensitivity (11.23.20 @ 18:11)   Troponin T, High Sensitivity: 68: Delta: 70 on 11/23/   RESULT CALLED TO: N/A (PREVIOUSLY CALLED)   DATE / TIME CALLED: 11/23/20 1850   CALLED BY: LULA   Delta: 70 on 11/23/                           14.9   6.42  )-----------( 137      ( 23 Nov 2020 13:00 )             44.1     11-23    141  |  103  |  14  ----------------------------<  109<H>  3.5   |  27  |  0.57    Ca    10.2      23 Nov 2020 13:00    TPro  7.1  /  Alb  4.5  /  TBili  0.9  /  DBili  x   /  AST  20  /  ALT  14  /  AlkPhos  69  11-23

## 2020-11-23 NOTE — ED ADULT NURSE NOTE - OBJECTIVE STATEMENT
Pt presents to the ED a&ox4 and ambulatory sent by PCP for abnormal EKG. Pt woke up this morning with crushing chest pain in the left side, nonreproducible and radiating to the left jaw and left arm. Pt endorsing tingling in the left hand. Pt endorsing SOB and lightheaded this morning with chest pain, currently SaO2 100% on RA. Upon arrival to the ED pt in rapid Afib, second EKG shows NSR. Pt currently NSR on the monitor. Breathing even and unlabored.  Pt states chest pain and SOB have resolved at this time. PMH includes x2 events of stomach tumors, twenty years ago and three years ago, s/p resection. No chemotherapy, no radiation. Abd soft and nontender. Awaiting MD johns. Will continue to monitor.

## 2020-11-23 NOTE — PROGRESS NOTE ADULT - ASSESSMENT
pt sent to er in rapid atrial fib converted to sinus rhythm anticoagulate rule out mi  serila eks enzymes o2 telemetry  control heart rate control bp dr sheridan consult

## 2020-11-23 NOTE — H&P ADULT - PROBLEM SELECTOR PLAN 1
- Given atypical chest pain on presentation w/ troponin 50 -> 70 concern for NSTEMI, on heparin gtt at this time. Pt does not use AC at home. No signs of heart failure, lungs CTABL, no peripheral edema.   - HEART score 5 points, RACE 12-16%  - will order TTE  - f/u cardiology reccs regarding repeat stress vs catheterization

## 2020-11-23 NOTE — CONSULT NOTE ADULT - ASSESSMENT
1. New onset afib with rapid VR    2. elevated troponin nl cpk with chest pain arm pain with afib increased VR possible demand ischemia vs primary CAD    3. hypertension    Recommend  2 D echo  discuss further ischemic workup with patient in AM stress vs cath vs medical therapy  eventually switch to eliquis or xarelto    will follow and discuss with Dr. Johnson and patient in AM    Adithya Tam NMD

## 2020-11-23 NOTE — ED ADULT TRIAGE NOTE - CHIEF COMPLAINT QUOTE
pt here for chest heaviness, shortness of breath, left arm numbness and tingling and jaw pain since this morning. pt was seen at her pcp and told to come to the ED for abnormal ekg pt here for chest heaviness, shortness of breath, left arm numbness and tingling and jaw pain since this morning. pt was seen at her pcp and told to come to the ED for abnormal ekg. Dr. Irvin for code stroke eval. no stroke called

## 2020-11-23 NOTE — H&P ADULT - NSICDXPASTMEDICALHX_GEN_ALL_CORE_FT
PAST MEDICAL HISTORY:  Gastrointestinal stromal tumor (GIST) first resection 20 years prior, last 4 years prior    High blood pressure

## 2020-11-23 NOTE — PROGRESS NOTE ADULT - SUBJECTIVE AND OBJECTIVE BOX
Subjective: Patient is a 70y old  Female who presents with a chief complaint of chest pain/afib Patient seen by me in office with sudden palpitations chest discomfort found in rapid atrial fib sent to er   PAST MEDICAL & SURGICAL HISTORY:  Gastrointestinal stromal tumor (GIST)  first resection 20 years prior, last 4 years prior    High blood pressure    No significant past surgical history        Allergies    No Known Allergies    Intolerances        MEDICATIONS  (STANDING):  heparin  Infusion.  Unit(s)/Hr (13 mL/Hr) IV Continuous <Continuous>  influenza   Vaccine 0.5 milliLiter(s) IntraMuscular once  losartan 100 milliGRAM(s) Oral daily    MEDICATIONS  (PRN):  acetaminophen   Tablet .. 650 milliGRAM(s) Oral every 6 hours PRN Temp greater or equal to 38C (100.4F), Mild Pain (1 - 3)  heparin   Injectable 3000 Unit(s) IV Push every 6 hours PRN For aPTT between 40 - 57  heparin   Injectable 6000 Unit(s) IV Push every 6 hours PRN For aPTT less than 40      CONSTITUTIONAL: No fever, weight loss, or fatigue  EYES: No eye pain, visual disturbances, or discharge  ENMT:  No difficulty hearing, tinnitus, vertigo; No sinus or throat pain  NECK: No pain or stiffness  BREASTS: No pain, masses, or nipple discharge  RESPIRATORY: No cough, wheezing, chills or hemoptysis; No shortness of breath  CARDIOVASCULAR: No chest pain, palpitations, dizziness, or leg swelling  GASTROINTESTINAL: No abdominal or epigastric pain. No nausea, vomiting, or hematemesis; No diarrhea or constipation. No melena or hematochezia.  GENITOURINARY: No dysuria, frequency, hematuria, or incontinence  NEUROLOGICAL: No headaches, memory loss, loss of strength, numbness, or tremors  SKIN: No itching, burning, rashes, or lesions   LYMPH NODES: No enlarged glands  ENDOCRINE: No heat or cold intolerance; No hair loss  MUSCULOSKELETAL: No joint pain or swelling; No muscle, back, or extremity pain  PSYCHIATRIC: No depression, anxiety, mood swings, or difficulty sleeping  HEME/LYMPH: No easy bruising, or bleeding gums  ALLERY AND IMMUNOLOGIC: No hives or eczema      PHYSICAL EXAM:    GENERAL: Comfortable, no acute distress  now in sinus   HEAD:  Normocephalic, atraumatic  EYES: EOMI, PERRLA  HEENT: Moist mucous membranes  NECK: Supple, No JVD  NERVOUS SYSTEM:  Alert & Oriented X3, Motor Strength 5/5 B/L upper and lower extremities  CHEST/LUNG: Clear to auscultation bilaterally  HEART: Regular rate and qtlbre777  ABDOMEN: Soft, Nontender, Nondistended, Bowel sounds present  GENITOURINARY: Voiding, no palpable bladder  EXTREMITIES:   No clubbing, cyanosis, or edema  MUSCULOSKELTAL- No muscle tenderness, no joint tenderness  SKIN-no rash            Vital Signs Last 24 Hrs  T(C): 36.4 (23 Nov 2020 20:17), Max: 37 (23 Nov 2020 13:56)  T(F): 97.5 (23 Nov 2020 20:17), Max: 98.6 (23 Nov 2020 13:56)  HR: 64 (23 Nov 2020 20:17) (59 - 82)  BP: 194/88 (23 Nov 2020 20:17) (163/97 - 199/102)  BP(mean): --  RR: 18 (23 Nov 2020 20:17) (14 - 18)  SpO2: 100% (23 Nov 2020 20:17) (100% - 100%)      LABS:    11-23    141  |  103  |  14  ----------------------------<  109<H>  3.5   |  27  |  0.57    Ca    10.2      23 Nov 2020 13:00    TPro  7.1  /  Alb  4.5  /  TBili  0.9  /  DBili  x   /  AST  20  /  ALT  14  /  AlkPhos  69  11-23    CBC Full  -  ( 23 Nov 2020 13:00 )  WBC Count : 6.42 K/uL  RBC Count : 5.07 M/uL  Hemoglobin : 14.9 g/dL  Hematocrit : 44.1 %  Platelet Count - Automated : 137 K/uL  Mean Cell Volume : 87.0 fL  Mean Cell Hemoglobin : 29.4 pg  Mean Cell Hemoglobin Concentration : 33.8 %  Auto Neutrophil # : 4.83 K/uL  Auto Lymphocyte # : 1.02 K/uL  Auto Monocyte # : 0.30 K/uL  Auto Eosinophil # : 0.19 K/uL  Auto Basophil # : 0.05 K/uL  Auto Neutrophil % : 75.1 %  Auto Lymphocyte % : 15.9 %  Auto Monocyte % : 4.7 %  Auto Eosinophil % : 3.0 %  Auto Basophil % : 0.8 %      LIVER FUNCTIONS - ( 23 Nov 2020 13:00 )  Alb: 4.5 g/dL / Pro: 7.1 g/dL / ALK PHOS: 69 u/L / ALT: 14 u/L / AST: 20 u/L / GGT: x                     Imaging Studies:a< from: Xray Chest 2 Views PA/Lat (11.23.20 @ 13:32) >    EXAM:  XR CHEST PA LAT 2V        PROCEDURE DATE:  Nov 23 2020         INTERPRETATION:  CLINICAL INDICATION: chest pain    EXAM:  Frontal and lateral chest from 11/23/2020 at 1332. No prior chest x-ray available at this institution for comparison.    IMPRESSION:  Clear lungs. No pleural effusions or pneumothorax.    Heart size within normal limits. Calcified slightly tortuous aorta. Unremarkable hilar shadows.    Trachea midline.    Generalized osteopenia and mild spinal degenerative change.    < end of copied text >      Impression / Plan:

## 2020-11-23 NOTE — ED PROVIDER NOTE - CLINICAL SUMMARY MEDICAL DECISION MAKING FREE TEXT BOX
Chest pain in setting of new afib. Pain much improved now. Afib now spontaneously converted to NSR. Will assess for causes of afib including ACS, infection, electrolyte disturbances and admit.

## 2020-11-23 NOTE — H&P ADULT - PROBLEM SELECTOR PLAN 4
- unsure of home medication, per [facility] was on losartan 100 mg QD last filled past 8/20  - No CATHERINE at this time, can resume

## 2020-11-23 NOTE — H&P ADULT - PROBLEM SELECTOR PLAN 5
- long standing history of anxiety and depression worsed recently in setting of loss of sister'  - continue to monitor for clinical findings, was on xanax 0.25 mg 08/20

## 2020-11-23 NOTE — ED PROVIDER NOTE - NS ED ROS FT
ROS:  GENERAL: No fever, no chills  EYES: no change in vision  HEENT: no trouble swallowing, no trouble speaking  CARDIAC: +chest pain   PULMONARY: no cough, no shortness of breath  GI: no abdominal pain, no nausea, no vomiting, no diarrhea, no constipation  : No dysuria, no frequency, no change in appearance, or odor of urine  SKIN: no rashes  NEURO: no headache, no weakness  MSK: No joint pain    Richmond Lindo PGY3

## 2020-11-23 NOTE — ED PROVIDER NOTE - CARE PLAN
Principal Discharge DX:	Atrial fibrillation   Principal Discharge DX:	Atrial fibrillation  Secondary Diagnosis:	Chest pain, unspecified type

## 2020-11-23 NOTE — H&P ADULT - PROBLEM SELECTOR PLAN 2
- new onset afib on admission, currently NSR 60's  - CHADSVASC score 3, w/ hep gtt for this time  - if requires rate control can start w/ metoprolol 25 mg QD

## 2020-11-23 NOTE — PHARMACOTHERAPY INTERVENTION NOTE - COMMENTS
Medication history is complete. Medication list updated in Outpatient Medication Record ([facility]). Please call spectra [phone] if you have any questions.

## 2020-11-23 NOTE — H&P ADULT - PROBLEM SELECTOR PLAN 3
- 2 resctions, first 20 years prior, last 4 years prior, no recent weight loss, no other GI Symptoms

## 2020-11-23 NOTE — H&P ADULT - NSHPSOCIALHISTORY_GEN_ALL_CORE
Pt from [place] left 50 years prior, lived in [place] for a time, retired but worked as caregiver here. Has 3 children. Lives w/ youngest son. Smoked 1-2 cigarettes 20 years prior, no EtoH use.

## 2020-11-23 NOTE — H&P ADULT - HISTORY OF PRESENT ILLNESS
The patient is a 71 yo woman with PMH of GIST, osteoperosis, HTN presenting with chest pain and new onset atrial fibrillation. Patient reports being in usual state of health last night. This morning around 830 AM developed L sided chest pain described as burning and squeezing w/ associated dyspnea. Pain radiated to jaw and down L arm w/ numbness and tingling. Pt also reports diaphoresis and nausea this AM. At this time CP and dyspnea resolved. Pt reports going to PCP office Iraj Johnson, was found to be in new onset atrial fibrillation here. Pt denies every having similar chest pain/palpitations  Reports stress and sleep deprivation in last 6 weeks in setting of passing of her sister. Last stress and echo 2 yrs prior without abnormalities, in outpatient office, pt w/ reports. No prior PCI/CABG/Valvular repair. Denies fevers, chills, URI symptoms, emesis. Otherwise tolerating po intake.    In the ED:  VS: T 98.2, HR 82, /101, RR 14/100  Labs: Troponin 50->70  EKG: On arrival afib w/ RVR to 152, now EKG NSR known TWI in II, V4, V5, V6, aVL, compared to EKG 2017. No KRYSTA/STD/Q waves.

## 2020-11-23 NOTE — ED PROVIDER NOTE - PHYSICAL EXAMINATION
Gen: AAOx3, non-toxic  Head: NCAT  HEENT: EOMI, oral mucosa moist, normal conjunctiva  Lung: CTAB, no respiratory distress, no wheezes/rhonchi/rales B/L, speaking in full sentences  CV: RRR, no murmurs, rubs or gallops  Abd: soft, NTND  MSK: no visible deformities  Neuro: No focal sensory or motor deficits  Skin: Warm, well perfused, no rash  Psych: normal affect.     Richmond Lindo PGY3

## 2020-11-23 NOTE — ED PROVIDER NOTE - PROGRESS NOTE DETAILS
MD CHO:  I was called to evaluate this pt for code stroke.  Pt endorses chest pain, shortness of breath, jaw pain, left arm discomfort.  No focal neuro deficits appreciated, fs wnl.  EKG demonstrates new afib.  No code stroke activated at this time.  I endorsed the patient to Dr. Reynaga in the Main ED for further w/u and evaluation. Dr. Tam requesting heparin and admission for further evaluation. Dr. Tam requesting heparin and admission for further evaluation. case d/w dr. dyer accepting to his service all results d/w patient. agreeable to plan for admission.

## 2020-11-23 NOTE — H&P ADULT - NSHPPHYSICALEXAM_GEN_ALL_CORE
PHYSICAL EXAM:  Vital Signs Last 24 Hrs  T(C): 37 (11-23-20 @ 13:56)  T(F): 98.6 (11-23-20 @ 13:56), Max: 98.6 (11-23-20 @ 13:56)  HR: 61 (11-23-20 @ 13:56) (61 - 82)  BP: 172/79 (11-23-20 @ 13:56)  BP(mean): --  RR: 14 (11-23-20 @ 13:56) (14 - 17)  SpO2: 100% (11-23-20 @ 13:56) (100% - 100%)  Wt(kg): --    Constitutional: NAD, awake and alert  EYES: EOMI  ENT:  Normal Hearing, no tonsillar exudates   Neck: Soft and supple , no thyromegaly   Respiratory: Breath sounds are clear bilaterally, No wheezing, rales or rhonchi  Cardiovascular: S1 and S2, regular rate and rhythm, no Murmurs, gallops or rubs, no JVD,    Gastrointestinal: Bowel Sounds present, soft, nontender, nondistended, no guarding, no rebound  Extremities: No cyanosis or clubbing; warm to touch  Vascular: 2+ peripheral pulses lower ex  Neurological: No focal deficits, CN II-XII intact bilaterally, sensation to light touch intact in all extremities, gait intact. Pupils are equally reactive to light and symmetrical in size.   Musculoskeletal: 5/5 strength b/l upper and lower extremities; no joint swelling.  Skin: No rashes  Psych:  AAOx3  HEME: no bruises, no nose bleeds

## 2020-11-23 NOTE — ED ADULT NURSE NOTE - CHIEF COMPLAINT QUOTE
pt here for chest heaviness, shortness of breath, left arm numbness and tingling and jaw pain since this morning. pt was seen at her pcp and told to come to the ED for abnormal ekg. Dr. Irvin for code stroke eval. no stroke called

## 2020-11-23 NOTE — ED PROVIDER NOTE - ATTENDING CONTRIBUTION TO CARE
agree with above hpi  on my exam  GEN - NAD; nontoxic appearing; A+O x3   HEAD - NC/AT   EYES- PERRL, EOMI  ENT: Airway patent, mmm, Oral cavity and pharynx normal. No inflammation, swelling, exudate, or lesions.  NECK: Neck supple, non-tender without lymphadenopathy, no masses.  PULMONARY - CTA b/l, symmetric breath sounds.   CARDIAC -s1s2, RRR, no M,G,R  ABDOMEN - +BS, ND, NT, soft, no guarding, no rebound, no masses   BACK - no CVA tenderness, Normal  spine   EXTREMITIES - FROM, symmetric pulses, capillary refill < 2 seconds, no edema   SKIN - no rash or bruising   NEUROLOGIC - alert, speech clear, no focal deficits  PSYCH -nl mood/affect, nl insight.  Patient with episode of cp as described above earlier today, found to be in rapid afib by her pcp dr. dyer-new for her, now resolved, back in sinus rhythm, no complaints at this time. Labs sent to eval for elec disturbance, organ dysfunction ischemia, etc, xr, chest done, D/w dr. sheridan and dr. dyer-plan to a/c with heparin so  if dr. sheridan needs to intervene he can, will w/u with echo and cardiac eval in hospital, admit to dr. dyson service on tele.

## 2020-11-23 NOTE — H&P ADULT - NSHPREVIEWOFSYSTEMS_GEN_ALL_CORE
ROS:    Constitutional: [ ] fevers [ ] chills [ ] weight loss [ ] weight gain  HEENT: [ ] dry eyes [ ] eye irritation [ ] postnasal drip [ ] nasal congestion  CV: [x ] chest pain [ ] orthopnea [ ] palpitations [ ] murmur  Resp: [ ] cough [ ] shortness of breath [ x] dyspnea [ ] wheezing [ ] sputum [ ] hemoptysis  GI: [ ] nausea [ ] vomiting [ ] diarrhea [ ] constipation [ ] abd pain [ ] dysphagia   : [ ] dysuria [ ] nocturia [ ] hematuria [ ] increased urinary frequency  Musculoskeletal: [ ] back pain [ ] myalgias [ ] arthralgias [ ] fracture  Skin: [ ] rash [ ] itch  Neurological: [ ] headache [ ] dizziness [ ] syncope [ ] weakness [ ] numbness  Psychiatric: [ ] anxiety [ ] depression  Endocrine: [ ] diabetes [ ] thyroid problem  Hematologic/Lymphatic: [ ] anemia [ ] bleeding problem  Allergic/Immunologic: [ ] itchy eyes [ ] nasal discharge [ ] hives [ ] angioedema  [x ] All other systems negative  [ ] Unable to assess ROS because ________

## 2020-11-23 NOTE — H&P ADULT - NSHPLABSRESULTS_GEN_ALL_CORE
14.9   6.42  )-----------( 137      ( 23 Nov 2020 13:00 )             44.1   11-23    141  |  103  |  14  ----------------------------<  109<H>  3.5   |  27  |  0.57    Ca    10.2      23 Nov 2020 13:00    TPro  7.1  /  Alb  4.5  /  TBili  0.9  /  DBili  x   /  AST  20  /  ALT  14  /  AlkPhos  69  11-23  Troponin T, High Sensitivity: 70: Delta: 50 on 11/23/  RESULT CALLED TO:  MENG BERNARD  DATE / TIME CALLED: 11/23/20 1534  CALLED BY: CLEO  Delta: 50 on 11/23/  ---------------------***PLEASE NOTE***----------------------  Rapid changes upward or downward in high-sensitivity  troponin levels strongly suggest acute myocardial injury.  Hemolysis may falsely lower results. Renal impairment may  increase results.    Normal: <6 - 14 ng/L  Indeterminate: 15 - 51 ng/L  Elevated: >51 ng/L    Please see "[email or web address] on the [employer] for more information. ng/L (11.23.20 @ 14:55)

## 2020-11-23 NOTE — ED ADULT NURSE REASSESSMENT NOTE - NS ED NURSE REASSESS COMMENT FT1
Pt currently awaiting chest xray results. Pt in no acute distress offering no complaints at this time. Will continue to monitor.

## 2020-11-24 ENCOUNTER — TRANSCRIPTION ENCOUNTER (OUTPATIENT)
Age: 70
End: 2020-11-24

## 2020-11-24 VITALS
SYSTOLIC BLOOD PRESSURE: 153 MMHG | TEMPERATURE: 99 F | OXYGEN SATURATION: 100 % | DIASTOLIC BLOOD PRESSURE: 90 MMHG | RESPIRATION RATE: 18 BRPM | HEART RATE: 78 BPM

## 2020-11-24 LAB
ALBUMIN SERPL ELPH-MCNC: 3.9 G/DL — SIGNIFICANT CHANGE UP (ref 3.3–5)
ALP SERPL-CCNC: 61 U/L — SIGNIFICANT CHANGE UP (ref 40–120)
ALT FLD-CCNC: 12 U/L — SIGNIFICANT CHANGE UP (ref 4–33)
ANION GAP SERPL CALC-SCNC: 11 MMO/L — SIGNIFICANT CHANGE UP (ref 7–14)
ANION GAP SERPL CALC-SCNC: 13 MMO/L — SIGNIFICANT CHANGE UP (ref 7–14)
APTT BLD: 148.7 SEC — CRITICAL HIGH (ref 27–36.3)
AST SERPL-CCNC: 16 U/L — SIGNIFICANT CHANGE UP (ref 4–32)
BASOPHILS # BLD AUTO: 0.03 K/UL — SIGNIFICANT CHANGE UP (ref 0–0.2)
BASOPHILS NFR BLD AUTO: 0.6 % — SIGNIFICANT CHANGE UP (ref 0–2)
BILIRUB SERPL-MCNC: 1.2 MG/DL — SIGNIFICANT CHANGE UP (ref 0.2–1.2)
BUN SERPL-MCNC: 14 MG/DL — SIGNIFICANT CHANGE UP (ref 7–23)
BUN SERPL-MCNC: 20 MG/DL — SIGNIFICANT CHANGE UP (ref 7–23)
CALCIUM SERPL-MCNC: 9.6 MG/DL — SIGNIFICANT CHANGE UP (ref 8.4–10.5)
CALCIUM SERPL-MCNC: 9.9 MG/DL — SIGNIFICANT CHANGE UP (ref 8.4–10.5)
CHLORIDE SERPL-SCNC: 101 MMOL/L — SIGNIFICANT CHANGE UP (ref 98–107)
CHLORIDE SERPL-SCNC: 102 MMOL/L — SIGNIFICANT CHANGE UP (ref 98–107)
CO2 SERPL-SCNC: 26 MMOL/L — SIGNIFICANT CHANGE UP (ref 22–31)
CO2 SERPL-SCNC: 28 MMOL/L — SIGNIFICANT CHANGE UP (ref 22–31)
CREAT SERPL-MCNC: 0.65 MG/DL — SIGNIFICANT CHANGE UP (ref 0.5–1.3)
CREAT SERPL-MCNC: 0.69 MG/DL — SIGNIFICANT CHANGE UP (ref 0.5–1.3)
EOSINOPHIL # BLD AUTO: 0.4 K/UL — SIGNIFICANT CHANGE UP (ref 0–0.5)
EOSINOPHIL NFR BLD AUTO: 7.4 % — HIGH (ref 0–6)
GLUCOSE SERPL-MCNC: 127 MG/DL — HIGH (ref 70–99)
GLUCOSE SERPL-MCNC: 86 MG/DL — SIGNIFICANT CHANGE UP (ref 70–99)
HCT VFR BLD CALC: 40.2 % — SIGNIFICANT CHANGE UP (ref 34.5–45)
HCT VFR BLD CALC: 40.2 % — SIGNIFICANT CHANGE UP (ref 34.5–45)
HGB BLD-MCNC: 13.2 G/DL — SIGNIFICANT CHANGE UP (ref 11.5–15.5)
HGB BLD-MCNC: 13.2 G/DL — SIGNIFICANT CHANGE UP (ref 11.5–15.5)
IMM GRANULOCYTES NFR BLD AUTO: 0.2 % — SIGNIFICANT CHANGE UP (ref 0–1.5)
LYMPHOCYTES # BLD AUTO: 1.54 K/UL — SIGNIFICANT CHANGE UP (ref 1–3.3)
LYMPHOCYTES # BLD AUTO: 28.3 % — SIGNIFICANT CHANGE UP (ref 13–44)
MAGNESIUM SERPL-MCNC: 2 MG/DL — SIGNIFICANT CHANGE UP (ref 1.6–2.6)
MCHC RBC-ENTMCNC: 28.8 PG — SIGNIFICANT CHANGE UP (ref 27–34)
MCHC RBC-ENTMCNC: 28.8 PG — SIGNIFICANT CHANGE UP (ref 27–34)
MCHC RBC-ENTMCNC: 32.8 % — SIGNIFICANT CHANGE UP (ref 32–36)
MCHC RBC-ENTMCNC: 32.8 % — SIGNIFICANT CHANGE UP (ref 32–36)
MCV RBC AUTO: 87.8 FL — SIGNIFICANT CHANGE UP (ref 80–100)
MCV RBC AUTO: 87.8 FL — SIGNIFICANT CHANGE UP (ref 80–100)
MONOCYTES # BLD AUTO: 0.38 K/UL — SIGNIFICANT CHANGE UP (ref 0–0.9)
MONOCYTES NFR BLD AUTO: 7 % — SIGNIFICANT CHANGE UP (ref 2–14)
NEUTROPHILS # BLD AUTO: 3.08 K/UL — SIGNIFICANT CHANGE UP (ref 1.8–7.4)
NEUTROPHILS NFR BLD AUTO: 56.5 % — SIGNIFICANT CHANGE UP (ref 43–77)
NRBC # FLD: 0 K/UL — SIGNIFICANT CHANGE UP (ref 0–0)
NRBC # FLD: 0 K/UL — SIGNIFICANT CHANGE UP (ref 0–0)
PHOSPHATE SERPL-MCNC: 3.6 MG/DL — SIGNIFICANT CHANGE UP (ref 2.5–4.5)
PLATELET # BLD AUTO: 133 K/UL — LOW (ref 150–400)
PLATELET # BLD AUTO: 133 K/UL — LOW (ref 150–400)
PMV BLD: 12.4 FL — SIGNIFICANT CHANGE UP (ref 7–13)
PMV BLD: 12.4 FL — SIGNIFICANT CHANGE UP (ref 7–13)
POTASSIUM SERPL-MCNC: 3 MMOL/L — LOW (ref 3.5–5.3)
POTASSIUM SERPL-MCNC: 3.8 MMOL/L — SIGNIFICANT CHANGE UP (ref 3.5–5.3)
POTASSIUM SERPL-SCNC: 3 MMOL/L — LOW (ref 3.5–5.3)
POTASSIUM SERPL-SCNC: 3.8 MMOL/L — SIGNIFICANT CHANGE UP (ref 3.5–5.3)
PROT SERPL-MCNC: 6.3 G/DL — SIGNIFICANT CHANGE UP (ref 6–8.3)
RBC # BLD: 4.58 M/UL — SIGNIFICANT CHANGE UP (ref 3.8–5.2)
RBC # BLD: 4.58 M/UL — SIGNIFICANT CHANGE UP (ref 3.8–5.2)
RBC # FLD: 13.6 % — SIGNIFICANT CHANGE UP (ref 10.3–14.5)
RBC # FLD: 13.6 % — SIGNIFICANT CHANGE UP (ref 10.3–14.5)
SODIUM SERPL-SCNC: 140 MMOL/L — SIGNIFICANT CHANGE UP (ref 135–145)
SODIUM SERPL-SCNC: 141 MMOL/L — SIGNIFICANT CHANGE UP (ref 135–145)
WBC # BLD: 5.44 K/UL — SIGNIFICANT CHANGE UP (ref 3.8–10.5)
WBC # BLD: 5.44 K/UL — SIGNIFICANT CHANGE UP (ref 3.8–10.5)
WBC # FLD AUTO: 5.44 K/UL — SIGNIFICANT CHANGE UP (ref 3.8–10.5)
WBC # FLD AUTO: 5.44 K/UL — SIGNIFICANT CHANGE UP (ref 3.8–10.5)

## 2020-11-24 PROCEDURE — 78452 HT MUSCLE IMAGE SPECT MULT: CPT | Mod: 26

## 2020-11-24 PROCEDURE — 93016 CV STRESS TEST SUPVJ ONLY: CPT | Mod: GC

## 2020-11-24 PROCEDURE — 93018 CV STRESS TEST I&R ONLY: CPT | Mod: GC

## 2020-11-24 PROCEDURE — 93306 TTE W/DOPPLER COMPLETE: CPT | Mod: 26

## 2020-11-24 PROCEDURE — 99232 SBSQ HOSP IP/OBS MODERATE 35: CPT

## 2020-11-24 RX ORDER — APIXABAN 2.5 MG/1
5 TABLET, FILM COATED ORAL EVERY 12 HOURS
Refills: 0 | Status: DISCONTINUED | OUTPATIENT
Start: 2020-11-24 | End: 2020-11-24

## 2020-11-24 RX ORDER — POTASSIUM CHLORIDE 20 MEQ
40 PACKET (EA) ORAL EVERY 4 HOURS
Refills: 0 | Status: COMPLETED | OUTPATIENT
Start: 2020-11-24 | End: 2020-11-24

## 2020-11-24 RX ORDER — APIXABAN 2.5 MG/1
1 TABLET, FILM COATED ORAL
Qty: 60 | Refills: 0
Start: 2020-11-24 | End: 2020-12-23

## 2020-11-24 RX ADMIN — APIXABAN 5 MILLIGRAM(S): 2.5 TABLET, FILM COATED ORAL at 14:21

## 2020-11-24 RX ADMIN — Medication 40 MILLIEQUIVALENT(S): at 14:20

## 2020-11-24 RX ADMIN — Medication 40 MILLIEQUIVALENT(S): at 10:23

## 2020-11-24 RX ADMIN — HEPARIN SODIUM 900 UNIT(S)/HR: 5000 INJECTION INTRAVENOUS; SUBCUTANEOUS at 09:14

## 2020-11-24 RX ADMIN — LOSARTAN POTASSIUM 100 MILLIGRAM(S): 100 TABLET, FILM COATED ORAL at 06:36

## 2020-11-24 RX ADMIN — HEPARIN SODIUM 0 UNIT(S)/HR: 5000 INJECTION INTRAVENOUS; SUBCUTANEOUS at 08:12

## 2020-11-24 NOTE — DISCHARGE NOTE NURSING/CASE MANAGEMENT/SOCIAL WORK - PATIENT PORTAL LINK FT
You can access the FollowMyHealth Patient Portal offered by [facility] by registering at the following website: [email or web address]. By joining [employer]’s FollowMyHealth portal, you will also be able to view your health information using other applications (apps) compatible with our system.

## 2020-11-24 NOTE — PROGRESS NOTE ADULT - ASSESSMENT
1. New onset afib with rapid VR    2. elevated troponin nl cpk with chest pain arm pain with afib increased VR possible demand ischemia vs primary CAD doubt primary CAD    3. hypertension    Recommend  2 D echo  nuclear stress test to R/O ischemia  start eliquis 5 BID  discharge if echo and stress normal  Adithya Tam NMD

## 2020-11-24 NOTE — DISCHARGE NOTE NURSING/CASE MANAGEMENT/SOCIAL WORK - NSTRANSFERBELONGINGSRESP_GEN_A_NUR
PT Re-Evaluation     Today's date: 10/21/2019  Patient name: Mayte Trimble  : 1961  MRN: [id number]  Referring provider: Isa Johnson PA-C  Dx:   Encounter Diagnosis     ICD-10-CM    1  Acute pain of left knee M25 562    2  Primary osteoarthritis of left knee M17 12 Ambulatory referral to Physical Therapy   3  Aftercare following left knee joint replacement surgery Z47 1     [id number]        Start Time: 845  Stop Time: 930  Total time in clinic (min): 45 minutes    Assessment  Assessment details: Mayte Trimble is a 59 y o  male who presents with signs and symptoms consistent of L TKA  Incision shows no signs of infection  Pt is compliant with HEP  Pt' functional improvements are demonstrated by 5 STS amd TUG  Patient presents with improvements in pain, strength and ROM  Due to these improvements, Patient has less difficulty performing a/iadls  Pt would have difficulty performing work related activities at this time and balance is limited demonstrated by tandem stance and SLS  Patient would continue benefit from skilled physical therapy to address the impairments, improve their level of function, and to improve their overall quality of life  Impairments: abnormal muscle tone, abnormal or restricted ROM, impaired balance, impaired physical strength, lacks appropriate home exercise program, pain with function and weight-bearing intolerance  Understanding of Dx/Px/POC: good   Prognosis: good    Goals  Short Term Goals: to be achieved by 4 weeks Partially met  1) Patient to be independent with basic HEP  2) Decrease pain to 2/10 at its worst   3) Increase Knee  ROM by 5-10 degrees   4) Increase LE strength by 1/2 MMT grade in all deficient planes      Long Term Goals: to be achieved by discharge Partially met  1) FOTO equal to or greater than 55   2) Ambulation to improve to maximal level of function  3) Stair negotiation will improve to reciprocal   4) Sit to stand transfers will improve to maximal level of function     Plan  Patient would benefit from: skilled physical therapy  Planned modality interventions: cryotherapy and thermotherapy: hydrocollator packs  Planned therapy interventions: ADL training, manual therapy, neuromuscular re-education, patient education, strengthening, stretching, therapeutic activities, therapeutic exercise, transfer training, home exercise program, graded exercise and functional ROM exercises  Frequency: Twice a week for 6 weeks  Subjective Evaluation    History of Present Illness  Mechanism of injury: Pt underwent L TKA on 19  No medical complication were reported during recovery or hospital stay  Pt has most difficulty with stairs and functional trasnfers     Patient Goals  Patient goals for therapy: decreased pain, independence with ADLs/IADLs, increased strength and return to sport/leisure activities          Objective     Active Range of Motion   Left Knee   Flexion: 120 degrees   Extension: -10 degrees     Passive Range of Motion   Left Knee   Flexion: 125 degrees   Extension: -5 degrees     Strength/Myotome Testing     Left Hip   Planes of Motion   Flexion: 4/5  Extension: 4/5  Abduction: 4/5  Adduction: 4/5    Right Hip   Planes of Motion   Flexion: 5  Extension: 5  Abduction: 5  Adduction: 5    Left Knee   Flexion: 4  Extension: 4  Quadriceps contraction: poor    Swelling     Left Knee Girth Measurement (cm)   Joint line: 52 cm  10 cm above joint line: 47 cm  10 cm below joint line: 46 cm    Functional Assessment        Comments  TU secs with RW  5 STS: 12 secs     SLS: 7 secs  Tandem: 19 secs    General Comments:      Knee Comments  Incision shows no signs of infection       Flowsheet Rows      Most Recent Value   PT/OT G-Codes   Current Score  48   Projected Score  53             Precautions: Current L TKA, previous R TKA        Manual          10/17/19   PROM         VK   Fib head mobs                                                    Exercise Diary  10/21 10/17/19   Quad sets          15x10"   Heel slides           3x10x5"   LAQ 3*10         3x10x5"   Mini squats 3*10*5"         3x10   Standing hip abd          3x10   Heel prop stretch 5'         5'   HR             SLR 3*10 2#         2x15   Step-ups 6" 3*10         6" 3x10   TKE CC 3*10 15#         3x10x5"   STS 3*10         3x10   Leg press 3*10                                                                                                                         Modalities  9/18/19 9/23/19           Ice with elevation/ankle pumps to finish 10 min 8 min yes

## 2020-11-24 NOTE — DISCHARGE NOTE PROVIDER - NSDCMRMEDTOKEN_GEN_ALL_CORE_FT
losartan 100 mg oral tablet: 1 tab(s) orally once a day   apixaban 5 mg oral tablet: 1 tab(s) orally every 12 hours  losartan 100 mg oral tablet: 1 tab(s) orally once a day

## 2020-11-24 NOTE — CHART NOTE - NSCHARTNOTEFT_GEN_A_CORE
Eliquis Co-pay 424 due to high deductible. Spoke with Dr Tam, 1 month free trail coupon applied and pt given 1 month supply by vivo (medication given to pt at bedside), Pt to followup with Dr Tam in 2 weeks and he can supply for additional samples.     Spoke with Dr Johnson pt cleared for discharge to followup with him on Friday or Monday

## 2020-11-24 NOTE — DISCHARGE NOTE PROVIDER - NSDCCPCAREPLAN_GEN_ALL_CORE_FT
PRINCIPAL DISCHARGE DIAGNOSIS  Diagnosis: Atrial fibrillation  Assessment and Plan of Treatment: Please take your medications as prescribed.  Continue to take your blood thinner as prescribed and follow with your physician.  Low fat diet, reduce caffeine intake, and exercise at least 30 minutes daily.      SECONDARY DISCHARGE DIAGNOSES  Diagnosis: Chest pain  Assessment and Plan of Treatment: you had echocardiogram***, nulcear stress****. followup with your cardiologist in 1-2 weeks    Diagnosis: Gastrointestinal stromal tumor (GIST)  Assessment and Plan of Treatment: followup with your gastroenterologist in 1-2 weeks    Diagnosis: HTN (hypertension)  Assessment and Plan of Treatment: Low sodium and fat diet, continue anti-hypertensive medications, and follow up with primary care physician.     PRINCIPAL DISCHARGE DIAGNOSIS  Diagnosis: Atrial fibrillation  Assessment and Plan of Treatment: Please take your medications as prescribed.  Continue to take your blood thinner as prescribed and follow with your physician.  Low fat diet, reduce caffeine intake, and exercise at least 30 minutes daily. Followup with Dr Tam in 2 week, your cardiologist will supply additional sample of eliquis      SECONDARY DISCHARGE DIAGNOSES  Diagnosis: Chest pain  Assessment and Plan of Treatment: you had echocardiogram noted EF 61 unremarkable, nulcear stress normal. followup with Dr Tam in 2 weeks    Diagnosis: Gastrointestinal stromal tumor (GIST)  Assessment and Plan of Treatment: followup with Dr Johnson on Friday or Monday    Diagnosis: HTN (hypertension)  Assessment and Plan of Treatment: Low sodium and fat diet, continue anti-hypertensive medications, and follow up with primary care physician.

## 2020-11-24 NOTE — DISCHARGE NOTE PROVIDER - CARE PROVIDER_API CALL
Iraj Johnson  GASTROENTEROLOGY  [address], [address]  [place] [address]  Phone: [phone]  Fax: [phone]  Follow Up Time:     Adithya Tam)  Cardiovascular Disease; Internal Medicine; Interventional Cardiology  [address] [place] [id number]  Phone: [phone]  Fax: [phone]  Follow Up Time:

## 2020-11-24 NOTE — PROVIDER CONTACT NOTE (CRITICAL VALUE NOTIFICATION) - ASSESSMENT
Pt assessed for signs of bleeding gums, nosebleed, unusual bruising, black tarry stools, hematuria, and for chills, fever,urticaria. Injection site assessed for hematomas, ecchymosis or inflamation/venipunctures longer pressure applied. APTT monitored/heparin infusion adjusted as per order.

## 2020-11-24 NOTE — PROGRESS NOTE ADULT - SUBJECTIVE AND OBJECTIVE BOX
Subjective: Patient is a 70y old  Female who presents with a chief complaint of chest pain/afib Patient seen by me in office with sudden palpitations chest discomfort found in rapid atrial fib sent to er   PAST MEDICAL & SURGICAL HISTORY:  Gastrointestinal stromal tumor (GIST)  first resection 20 years prior, last 4 years prior    High blood pressure    No significant past surgical history        Allergies    No Known Allergies    Intolerances        MEDICATIONS  (STANDING):  heparin  Infusion.  Unit(s)/Hr (13 mL/Hr) IV Continuous <Continuous>  influenza   Vaccine 0.5 milliLiter(s) IntraMuscular once  losartan 100 milliGRAM(s) Oral daily    MEDICATIONS  (PRN):  acetaminophen   Tablet .. 650 milliGRAM(s) Oral every 6 hours PRN Temp greater or equal to 38C (100.4F), Mild Pain (1 - 3)  heparin   Injectable 3000 Unit(s) IV Push every 6 hours PRN For aPTT between 40 - 57  heparin   Injectable 6000 Unit(s) IV Push every 6 hours PRN For aPTT less than 40      CONSTITUTIONAL: No fever, weight loss, or fatigue  EYES: No eye pain, visual disturbances, or discharge  ENMT:  No difficulty hearing, tinnitus, vertigo; No sinus or throat pain  NECK: No pain or stiffness  BREASTS: No pain, masses, or nipple discharge  RESPIRATORY: No cough, wheezing, chills or hemoptysis; No shortness of breath  CARDIOVASCULAR: No chest pain, palpitations, dizziness, or leg swelling  GASTROINTESTINAL: No abdominal or epigastric pain. No nausea, vomiting, or hematemesis; No diarrhea or constipation. No melena or hematochezia.  GENITOURINARY: No dysuria, frequency, hematuria, or incontinence  NEUROLOGICAL: No headaches, memory loss, loss of strength, numbness, or tremors  SKIN: No itching, burning, rashes, or lesions   LYMPH NODES: No enlarged glands  ENDOCRINE: No heat or cold intolerance; No hair loss  MUSCULOSKELETAL: No joint pain or swelling; No muscle, back, or extremity pain  PSYCHIATRIC: No depression, anxiety, mood swings, or difficulty sleeping  HEME/LYMPH: No easy bruising, or bleeding gums  ALLERY AND IMMUNOLOGIC: No hives or eczema      PHYSICAL EXAM:    GENERAL: Comfortable, no acute distress  now in sinus   HEAD:  Normocephalic, atraumatic  EYES: EOMI, PERRLA  HEENT: Moist mucous membranes  NECK: Supple, No JVD  NERVOUS SYSTEM:  Alert & Oriented X3, Motor Strength 5/5 B/L upper and lower extremities  CHEST/LUNG: Clear to auscultation bilaterally  HEART: Regular rate and [id number]  ABDOMEN: Soft, Nontender, Nondistended, Bowel sounds present  GENITOURINARY: Voiding, no palpable bladder  EXTREMITIES:   No clubbing, cyanosis, or edema  MUSCULOSKELTAL- No muscle tenderness, no joint tenderness  SKIN-no rash            Vital Signs Last 24 Hrs  T(C): 36.4 (23 Nov 2020 20:17), Max: 37 (23 Nov 2020 13:56)  T(F): 97.5 (23 Nov 2020 20:17), Max: 98.6 (23 Nov 2020 13:56)  HR: 64 (23 Nov 2020 20:17) (59 - 82)  BP: 194/88 (23 Nov 2020 20:17) (163/97 - 199/102)  BP(mean): --  RR: 18 (23 Nov 2020 20:17) (14 - 18)  SpO2: 100% (23 Nov 2020 20:17) (100% - 100%)  la    LABS:    11-23    141  |  103  |  14  ----------------------------<  109<H>  3.5   |  27  |  0.57    Ca    10.2      23 Nov 2020 13:00    TPro  7.1  /  Alb  4.5  /  TBili  0.9  /  DBili  x   /  AST  20  /  ALT  14  /  AlkPhos  69  11-23    CBC Full  -  ( 23 Nov 2020 13:00 )  WBC Count : 6.42 K/uL  RBC Count : 5.07 M/uL  Hemoglobin : 14.9 g/dL  Hematocrit : 44.1 %  Platelet Count - Automated : 137 K/uL  Mean Cell Volume : 87.0 fL  Mean Cell Hemoglobin : 29.4 pg  Mean Cell Hemoglobin Concentration : 33.8 %  Auto Neutrophil # : 4.83 K/uL  Auto Lymphocyte # : 1.02 K/uL  Auto Monocyte # : 0.30 K/uL  Auto Eosinophil # : 0.19 K/uL  Auto Basophil # : 0.05 K/uL  Auto Neutrophil % : 75.1 %  Auto Lymphocyte % : 15.9 %  Auto Monocyte % : 4.7 %  Auto Eosinophil % : 3.0 %  Auto Basophil % : 0.8 %      LIVER FUNCTIONS - ( 23 Nov 2020 13:00 )  Alb: 4.5 g/dL / Pro: 7.1 g/dL / ALK PHOS: 69 u/L / ALT: 14 u/L / AST: 20 u/L / GGT: x                     Imaging Studies:a< from: Xray Chest 2 Views PA/Lat (11.23.20 @ 13:32) >    EXAM:  XR CHEST PA LAT 2V        PROCEDURE DATE:  Nov 23 2020         INTERPRETATION:  CLINICAL INDICATION: chest pain    EXAM:  Frontal and lateral chest from 11/23/2020 at 1332. No prior chest x-ray available at this institution for comparison.    IMPRESSION:  Clear lungs. No pleural effusions or pneumothorax.    Heart size within normal limits. Calcified slightly tortuous aorta. Unremarkable hilar shadows.    Trachea midline.    Generalized osteopenia and mild spinal degenerative change.    < end of copied text >      Impression / Plan:

## 2020-11-24 NOTE — DISCHARGE NOTE PROVIDER - HOSPITAL COURSE
69 yo woman with PMH of GIST, osteopetrosis, HTN presenting with chest pain and new onset atrial fibrillation w/o EKG changes w/ tropinemia concern for NSTEMI    Hospital course:    Pt admitted with CP and new onset afib started on hep gtt. Trop elevated but no delta change. CK negative. Elevated trop likely due to afib. Pt seen by cardiology hep gtt transitioned to eliquis. TTE ****. NST****    Case discussed with Dr Johnson****. Reviewed discharge medications with patient; All new medications requiring new prescription sent to pharmacy of patients choice. Reviewed need for prescription for previous home medication and new prescriptions sent if requested. Patient in agreement and understands. 71 yo woman with PMH of GIST, osteopetrosis, HTN presenting with chest pain and new onset atrial fibrillation w/o EKG changes w/ tropinemia concern for NSTEMI    Hospital course:    Pt admitted with CP and new onset afib started on hep gtt. Trop elevated but no delta change. CK negative. Elevated trop likely due to afib. Pt seen by cardiology hep gtt transitioned to eliquis. TTE EF 61 unremarkable. NST normal.     Case discussed with Dr Johnson pt medicalled stable for discharge on 11/24. Reviewed discharge medications with patient; All new medications requiring new prescription sent to pharmacy of patients choice. Reviewed need for prescription for previous home medication and new prescriptions sent if requested. Patient in agreement and understands.

## 2020-11-24 NOTE — PROGRESS NOTE ADULT - SUBJECTIVE AND OBJECTIVE BOX
Subjective: Patient seen and examined. No new events except as noted.   Feels well no cp or sob  no recurence of afib  BOP not ideally controlled but better  troponin 50-70 cpk normal doubt secondary to CAD probably related to afib and hypertenison  MEDICATIONS:  MEDICATIONS  (STANDING):  apixaban 5 milliGRAM(s) Oral every 12 hours  influenza   Vaccine 0.5 milliLiter(s) IntraMuscular once  losartan 100 milliGRAM(s) Oral daily  potassium chloride    Tablet ER 40 milliEquivalent(s) Oral every 4 hours      PHYSICAL EXAM:  T(C): 36.4 (11-24-20 @ 06:36), Max: 37 (11-23-20 @ 13:56)  HR: 65 (11-24-20 @ 06:36) (57 - 82)  BP: 155/98 (11-24-20 @ 06:36) (155/98 - 199/102)  RR: 18 (11-24-20 @ 06:36) (14 - 18)  SpO2: 96% (11-24-20 @ 06:36) (96% - 100%)  Wt(kg): --  I&O's Summary    Height (cm): 162.6 (11-23 @ 12:10)  Weight (kg): 72 (11-23 @ 12:10)  BMI (kg/m2): 27.2 (11-23 @ 12:10)  BSA (m2): 1.77 (11-23 @ 12:10)    Appearance: Normal	  HEENT:   Normal oral mucosa, PERRL, EOMI	  Cardiovascular: Normal S1 S2, No JVD, No murmurs ,  Respiratory: Lungs clear to auscultation, normal effort 	  Psychiatry:  Mood & affect appropriate  Ext: No edema  Peripheral pulses palpable 2+ bilaterally      LABS:    CARDIAC MARKERS:  CARDIAC MARKERS ( 23 Nov 2020 18:11 )  x     / x     / 48 u/L / x     / x                                    13.2   5.44  )-----------( 133      ( 24 Nov 2020 05:30 )             40.2     11-24    141  |  102  |  14  ----------------------------<  86  3.0<L>   |  26  |  0.65    Ca    9.6      24 Nov 2020 05:30  Phos  3.6     11-24  Mg     2.0     11-24    TPro  6.3  /  Alb  3.9  /  TBili  1.2  /  DBili  x   /  AST  16  /  ALT  12  /  AlkPhos  61  11-24    proBNP:   Lipid Profile:   HgA1c:   TSH:           TELEMETRY: 	    ECG:  	  RADIOLOGY:   DIAGNOSTIC TESTING:  [ ] Echocardiogram:  [ ]  Catheterization:  [ ] Stress Test:    OTHER:

## 2020-11-30 PROBLEM — C49.A0 GASTROINTESTINAL STROMAL TUMOR, UNSPECIFIED SITE: Chronic | Status: ACTIVE | Noted: 2020-11-23

## 2020-12-23 ENCOUNTER — APPOINTMENT (OUTPATIENT)
Dept: CARDIOLOGY | Facility: CLINIC | Age: 70
End: 2020-12-23
Payer: MEDICARE

## 2020-12-23 VITALS
TEMPERATURE: 97.9 F | OXYGEN SATURATION: 98 % | HEIGHT: 64 IN | BODY MASS INDEX: 27.31 KG/M2 | WEIGHT: 160 LBS | SYSTOLIC BLOOD PRESSURE: 156 MMHG | HEART RATE: 62 BPM | DIASTOLIC BLOOD PRESSURE: 80 MMHG

## 2020-12-23 DIAGNOSIS — K57.90 DIVERTICULOSIS OF INTESTINE, PART UNSPECIFIED, W/OUT PERFORATION OR ABSCESS W/OUT BLEEDING: ICD-10-CM

## 2020-12-23 DIAGNOSIS — D21.4 BENIGN NEOPLASM OF CONNECTIVE AND OTHER SOFT TISSUE OF ABDOMEN: ICD-10-CM

## 2020-12-23 PROCEDURE — 99204 OFFICE O/P NEW MOD 45 MIN: CPT

## 2020-12-23 NOTE — REASON FOR VISIT
[FreeTextEntry1] : Cait Barry 70 years old history of hypertension recently admitted to [facility] with palpitations chest pain new onset atrial fibrillation

## 2020-12-23 NOTE — ASSESSMENT
[FreeTextEntry1] : 1.  Hypertension well-controlled\par \par 2.  Paroxysmal atrial fibrillation patient remains in normal sinus rhythm and tolerating atenolol and Eliquis\par \par 3.  Normal noninvasive cardiac work-up in November 2020 including echo and nuclear stress test\par \par 4.  Platelet count 130,000 apparently this is chronic no bleeding tendencies\par \par Overall the patient is stable without symptoms looks well and blood pressure is under reasonable control

## 2020-12-23 NOTE — HISTORY OF PRESENT ILLNESS
[FreeTextEntry1] : Cait is 70 years old with a history of hypertension otherwise in good health without significant hospitalizations\par \par She was admitted to [facility] at the end of November 2020 with palpitations and chest pain and abnormal troponin.  She was in atrial fibrillation with a rapid ventricular response and was placed on beta-blocker and reverted to normal sinus rhythm and the chest pain resolved\par \par Noninvasive work-up included\par Echocardiogram with normal LA size normal LV and RV function and no valvular disease\par \par Exercise nuclear stress test within normal limits normal ejection fraction no evidence of ischemia or infarction\par \par Since discharge she is remained in normal sinus rhythm\par Last EKG in your office normal sinus rhythm within normal limits\par \par She denies shortness of breath chest pain palpitations dizziness syncope edema orthopnea PND she feels well and tolerating her present regimen of medications

## 2020-12-23 NOTE — PHYSICAL EXAM
[Normal Appearance] : normal appearance [General Appearance - Well Nourished] : well nourished [General Appearance - In No Acute Distress] : no acute distress [Normal Conjunctiva] : the conjunctiva exhibited no abnormalities [No Jugular Venous Rivera A Waves] : no jugular venous rivera A waves [Heart Sounds] : normal S1 and S2 [Murmurs] : no murmurs present [Respiration, Rhythm And Depth] : normal respiratory rhythm and effort [Auscultation Breath Sounds / Voice Sounds] : lungs were clear to auscultation bilaterally [Abdomen Soft] : soft [Abdomen Tenderness] : non-tender [Abnormal Walk] : normal gait [Cyanosis, Localized] : no localized cyanosis [FreeTextEntry1] : Pulses 2+ dorsalis pedis no edema

## 2020-12-23 NOTE — DISCUSSION/SUMMARY
[FreeTextEntry1] : For now the patient should continue on her present regimen of medications.  She should continue on the Eliquis 5 mg twice daily and atenolol.\par \par I advised her to try to lose weight exercise and keep a low-salt diet.  She is quite compliant both with diet and with her medications\par \par Routine follow with me again in 3 months

## 2021-03-12 ENCOUNTER — APPOINTMENT (OUTPATIENT)
Dept: CARDIOLOGY | Facility: CLINIC | Age: 71
End: 2021-03-12
Payer: MEDICARE

## 2021-03-12 VITALS
SYSTOLIC BLOOD PRESSURE: 162 MMHG | OXYGEN SATURATION: 97 % | WEIGHT: 158 LBS | DIASTOLIC BLOOD PRESSURE: 81 MMHG | HEIGHT: 64 IN | HEART RATE: 66 BPM | BODY MASS INDEX: 26.98 KG/M2

## 2021-03-12 PROCEDURE — 99214 OFFICE O/P EST MOD 30 MIN: CPT

## 2021-03-12 NOTE — HISTORY OF PRESENT ILLNESS
[FreeTextEntry1] : Cait is 70 years old with a history of hypertension otherwise in good health without significant hospitalizations\par \par She was admitted to [facility] at the end of November 2020 with palpitations and chest pain and abnormal troponin.  She was in atrial fibrillation with a rapid ventricular response and was placed on beta-blocker and reverted to normal sinus rhythm and the chest pain resolved\par \par Noninvasive work-up included\par Echocardiogram with normal LA size normal LV and RV function and no valvular disease\par \par Exercise nuclear stress test within normal limits normal ejection fraction no evidence of ischemia or infarction\par \par Since discharge she is remained in normal sinus rhythm\par Last EKG in your office normal sinus rhythm within normal limits\par \par She denies shortness of breath chest pain palpitations dizziness syncope edema orthopnea PND she feels well and tolerating her present regimen of medications\par \par Since last visit she has undergone 2 endoscopies and apparently had a fungus infection which was treated and now she is clear\par \par She has had no further episodes of palpitations chest pain shortness of breath.  She is on stable medications and is no longer on Eliquis but is on aspirin 325

## 2021-03-12 NOTE — DISCUSSION/SUMMARY
[FreeTextEntry1] : 1.  Salt restriction and exercise\par 2.  Continue present regimen medications\par 3.  At this point no need for anticoagulation\par \par Routine follow with me again in 6 months

## 2021-03-12 NOTE — PHYSICAL EXAM
[FreeTextEntry1] : Right inflammatory lobular right breast cancer. \par S/p neoadjuvant chemotherapy as per Dr. Hollis. \par S/p bilateral mastectomy October 2020. \par No residual cancer on final pathology. \par Continue drains for another week . \par RTO 1 week\par Pt to follow up with Dr. Hollis of med/onc on Friday. \par   [Normal Appearance] : normal appearance [General Appearance - Well Nourished] : well nourished [General Appearance - In No Acute Distress] : no acute distress [Normal Conjunctiva] : the conjunctiva exhibited no abnormalities [No Jugular Venous Rivera A Waves] : no jugular venous rivera A waves [Respiration, Rhythm And Depth] : normal respiratory rhythm and effort [Auscultation Breath Sounds / Voice Sounds] : lungs were clear to auscultation bilaterally [Heart Sounds] : normal S1 and S2 [Murmurs] : no murmurs present [Abdomen Soft] : soft [Abdomen Tenderness] : non-tender [Abnormal Walk] : normal gait [Cyanosis, Localized] : no localized cyanosis [FreeTextEntry1] : Pulses 2+ dorsalis pedis no edema

## 2021-03-12 NOTE — ASSESSMENT
[FreeTextEntry1] : 1.  Hypertension well-controlled at the start of examination her blood pressure is elevated but eventually comes down to relatively normal\par \par 2.  Paroxysmal atrial fibrillation patient remains in normal sinus rhythm and tolerating atenolol , off Eliquis and just on aspirin\par \par 3.  Normal noninvasive cardiac work-up in November 2020 including echo and nuclear stress test\par \par Overall the patient is stable without symptoms looks well and blood pressure is under reasonable control

## 2021-03-12 NOTE — REASON FOR VISIT
[FreeTextEntry1] : Cait Vázquez was seen in routine follow-up for hypertension and 1 episode of paroxysmal atrial fibrillation.\par \par Primary physician: Dr Iraj Johnson

## 2021-09-30 ENCOUNTER — APPOINTMENT (OUTPATIENT)
Dept: CARDIOLOGY | Facility: CLINIC | Age: 71
End: 2021-09-30
Payer: MEDICARE

## 2021-09-30 VITALS
HEIGHT: 64 IN | SYSTOLIC BLOOD PRESSURE: 163 MMHG | OXYGEN SATURATION: 98 % | HEART RATE: 68 BPM | WEIGHT: 160 LBS | DIASTOLIC BLOOD PRESSURE: 79 MMHG | BODY MASS INDEX: 27.31 KG/M2

## 2021-09-30 PROCEDURE — 99214 OFFICE O/P EST MOD 30 MIN: CPT

## 2021-09-30 RX ORDER — APIXABAN 5 MG/1
5 TABLET, FILM COATED ORAL
Refills: 0 | Status: DISCONTINUED | COMMUNITY
Start: 2020-12-23 | End: 2021-09-30

## 2021-09-30 NOTE — DISCUSSION/SUMMARY
[FreeTextEntry1] : I reassured her that her cardiac status is stable and no further cardiac work-up is needed presently.  I urged her to try to decrease her caloric intake exercise and be careful with the salt to diet and continue her present regimen of medications\par \par Routine follow with me again in 6 months

## 2021-09-30 NOTE — HISTORY OF PRESENT ILLNESS
[FreeTextEntry1] : Cait is 70 years old with a history of hypertension otherwise in good health without significant hospitalizations\par \par She was admitted to [facility] at the end of November 2020 with palpitations and chest pain and abnormal troponin.  She was in atrial fibrillation with a rapid ventricular response and was placed on beta-blocker and reverted to normal sinus rhythm and the chest pain resolved\par \par Noninvasive work-up included\par Echocardiogram with normal LA size normal LV and RV function and no valvular disease\par \par Exercise nuclear stress test within normal limits normal ejection fraction no evidence of ischemia or infarction\par \par Since discharge she is remained in normal sinus rhythm\par Last EKG in your office normal sinus rhythm within normal limits\par \par She denies shortness of breath chest pain palpitations dizziness syncope edema orthopnea PND she feels well and tolerating her present regimen of medications\par \par Since last visit she has undergone 2 endoscopies and apparently had a fungus infection which was treated and now she is clear\par \par She has had no further episodes of palpitations chest pain shortness of breath.  She is on stable medications and is no longer on Eliquis but is on aspirin 325\par \par Continues to feel well.  She also sees Dr. Jorje Martini is repeated her echocardiogram which was normal and is done carotid Doppler with some minor plaque and has scheduled her for a CT coronary score.\par \par She overall feels well and denies chest pain chest pressure shortness of breath.  She tries to be careful with the salt in her diet.  She has no palpitations

## 2021-09-30 NOTE — ASSESSMENT
[FreeTextEntry1] : 1.  Hypertension well-controlled on losartan 100 hydrochlorthiazide and atenolol 25\par \par 2.  Paroxysmal atrial fibrillation patient remains in normal sinus rhythm and tolerating atenolol , off Eliquis and just on aspirin.  No recurrence of atrial fibrillation\par \par 3.  Normal noninvasive cardiac work-up in November 2020 including echo and nuclear stress test\par \par 4.  Minor plaque in the carotid artery nonobstructive\par \par Overall the patient is stable without symptoms looks well and blood pressure is under reasonable control

## 2021-09-30 NOTE — PHYSICAL EXAM
[Normal Appearance] : normal appearance [General Appearance - Well Nourished] : well nourished [General Appearance - In No Acute Distress] : no acute distress [Normal Conjunctiva] : the conjunctiva exhibited no abnormalities [No Jugular Venous Rivera A Waves] : no jugular venous rivera A waves [Respiration, Rhythm And Depth] : normal respiratory rhythm and effort [Auscultation Breath Sounds / Voice Sounds] : lungs were clear to auscultation bilaterally [Heart Sounds] : normal S1 and S2 [Murmurs] : no murmurs present [Abdomen Soft] : soft [Abdomen Tenderness] : non-tender [Abnormal Walk] : normal gait [Cyanosis, Localized] : no localized cyanosis [FreeTextEntry1] : Pulses 2+ dorsalis pedis no edema

## 2022-03-04 NOTE — PATIENT PROFILE ADULT - TOBACCO USE
Daily chart review complete.  Patient deferred bedside visit this date stating that she did not want to talk.  No other questions or concerns at this time.  NN continues to follow.      Per current GOLD Standards, please consider: No LAMA/LABA/ICS in place, Outpatient PFT, Rehab as appropriate, Palliative care consult        Patient has been scheduled for a hospital follow up with [facility] for 3/16/2022 @ 1:30 pm with AMA Hayes.                   Former smoker

## 2022-04-06 ENCOUNTER — APPOINTMENT (OUTPATIENT)
Dept: CARDIOLOGY | Facility: CLINIC | Age: 72
End: 2022-04-06
Payer: MEDICARE

## 2022-04-06 VITALS
DIASTOLIC BLOOD PRESSURE: 84 MMHG | OXYGEN SATURATION: 98 % | WEIGHT: 161 LBS | SYSTOLIC BLOOD PRESSURE: 120 MMHG | HEART RATE: 57 BPM | BODY MASS INDEX: 27.64 KG/M2

## 2022-04-06 DIAGNOSIS — R10.9 UNSPECIFIED ABDOMINAL PAIN: ICD-10-CM

## 2022-04-06 DIAGNOSIS — R03.0 ELEVATED BLOOD-PRESSURE READING, W/OUT DIAGNOSIS OF HYPERTENSION: ICD-10-CM

## 2022-04-06 PROCEDURE — 99214 OFFICE O/P EST MOD 30 MIN: CPT

## 2022-04-06 RX ORDER — AZELASTINE HYDROCHLORIDE 205.5 UG/1
0.15 SPRAY, METERED NASAL
Qty: 30 | Refills: 0 | Status: DISCONTINUED | COMMUNITY
Start: 2019-12-19 | End: 2022-04-06

## 2022-04-06 RX ORDER — LEVOCETIRIZINE DIHYDROCHLORIDE 5 MG/1
5 TABLET ORAL
Qty: 30 | Refills: 0 | Status: DISCONTINUED | COMMUNITY
Start: 2019-12-19 | End: 2022-04-06

## 2022-04-06 RX ORDER — ASPIRIN 325 MG/1
325 TABLET ORAL DAILY
Refills: 0 | Status: ACTIVE | COMMUNITY

## 2022-04-06 NOTE — HISTORY OF PRESENT ILLNESS
[FreeTextEntry1] : Cait is 72 years old with a history of hypertension otherwise in good health without significant hospitalizations\par \par She was admitted to [facility] at the end of November 2020 with palpitations and chest pain and abnormal troponin.  She was in atrial fibrillation with a rapid ventricular response and was placed on beta-blocker and reverted to normal sinus rhythm and the chest pain resolved\par \par Noninvasive work-up included\par Echocardiogram with normal LA size normal LV and RV function and no valvular disease\par \par Exercise nuclear stress test within normal limits normal ejection fraction no evidence of ischemia or infarction\par \par Since discharge she is remained in normal sinus rhythm\par \par \par She denies shortness of breath chest pain palpitations dizziness syncope edema orthopnea PND she feels well and tolerating her present regimen of medications\par \par Since last visit she has undergone 2 endoscopies and apparently had a fungus infection which was treated and now she is clear\par \par She has had no further episodes of palpitations chest pain shortness of breath.  She is on stable medications and is no longer on Eliquis but is on aspirin 325\par \par Continues to feel well.  She also sees Dr. Jorje Martini is repeated her echocardiogram which was normal and is done carotid Doppler with some minor plaque and has scheduled her for a CT coronary score.\par \par She overall feels well and denies chest pain chest pressure shortness of breath.  She tries to be careful with the salt in her diet.  She has no palpitations\par   \par Since last visit several months ago, she has felt well without chest pain chest pressure shortness of breath palpitations dizziness or syncope.  She is on stable medications for her hypertension and her blood pressure has been well controlled\par \par ECG from Dr. Robledo  office February 2, 2022 normal sinus rhythm T inversions 1 aVL V2 V5 and V6 unchanged.  These changes are nonspecific

## 2022-04-06 NOTE — DISCUSSION/SUMMARY
[FreeTextEntry1] : Patient will continue on her present regimen of medications.  No further cardiac work-up is needed.  I renewed her atenolol 25 mg\par \par Routine follow with me again in 4 to 6 months

## 2022-04-06 NOTE — ASSESSMENT
[FreeTextEntry1] : 1.  Hypertension well-controlled on losartan 100 hydrochlorthiazide and atenolol 25\par \par 2.  Paroxysmal atrial fibrillation patient remains in normal sinus rhythm and tolerating atenolol , off Eliquis and just on aspirin.  No recurrence of atrial fibrillation\par \par 3.  Normal noninvasive cardiac work-up in November 2020 including echo and nuclear stress test\par \par 4.  Minor plaque in the carotid artery nonobstructive\par \par Overall the patient is stable without symptoms looks well and blood pressure is under reasonable control.  She has a mildly abnormal EKG with some T inversions laterally

## 2022-09-14 ENCOUNTER — NON-APPOINTMENT (OUTPATIENT)
Age: 72
End: 2022-09-14

## 2022-09-14 ENCOUNTER — APPOINTMENT (OUTPATIENT)
Dept: INTERNAL MEDICINE | Facility: CLINIC | Age: 72
End: 2022-09-14

## 2022-09-14 PROCEDURE — 36415 COLL VENOUS BLD VENIPUNCTURE: CPT

## 2022-09-15 LAB
ALBUMIN SERPL ELPH-MCNC: 4.1 G/DL
ALP BLD-CCNC: 202 U/L
ALT SERPL-CCNC: 16 U/L
ANION GAP SERPL CALC-SCNC: 15 MMOL/L
AST SERPL-CCNC: 16 U/L
BILIRUB SERPL-MCNC: 0.6 MG/DL
BUN SERPL-MCNC: 20 MG/DL
CALCIUM SERPL-MCNC: 10.1 MG/DL
CHLORIDE SERPL-SCNC: 102 MMOL/L
CO2 SERPL-SCNC: 26 MMOL/L
CREAT SERPL-MCNC: 0.67 MG/DL
EGFR: 92 ML/MIN/1.73M2
GLUCOSE SERPL-MCNC: 101 MG/DL
POTASSIUM SERPL-SCNC: 4.1 MMOL/L
PROT SERPL-MCNC: 7 G/DL
SODIUM SERPL-SCNC: 143 MMOL/L

## 2022-09-21 ENCOUNTER — APPOINTMENT (OUTPATIENT)
Dept: INTERNAL MEDICINE | Facility: CLINIC | Age: 72
End: 2022-09-21

## 2022-09-21 VITALS
HEIGHT: 64 IN | SYSTOLIC BLOOD PRESSURE: 156 MMHG | TEMPERATURE: 98.3 F | BODY MASS INDEX: 27.31 KG/M2 | DIASTOLIC BLOOD PRESSURE: 90 MMHG | WEIGHT: 160 LBS

## 2022-09-21 DIAGNOSIS — Z13.820 ENCOUNTER FOR SCREENING FOR OSTEOPOROSIS: ICD-10-CM

## 2022-09-21 PROCEDURE — 96372 THER/PROPH/DIAG INJ SC/IM: CPT

## 2022-09-21 PROCEDURE — 99213 OFFICE O/P EST LOW 20 MIN: CPT | Mod: 25

## 2022-09-21 RX ORDER — DENOSUMAB 60 MG/ML
60 INJECTION SUBCUTANEOUS
Qty: 1 | Refills: 0 | Status: COMPLETED | OUTPATIENT
Start: 2022-09-21

## 2022-09-21 RX ADMIN — DENOSUMAB 0 MG/ML: 60 INJECTION SUBCUTANEOUS at 00:00

## 2022-09-21 NOTE — PLAN
[FreeTextEntry1] : elevated alk phos-? related to fall and fracture.\par I recommend follow up with her PCP regarding this.

## 2022-09-21 NOTE — HISTORY OF PRESENT ILLNESS
[FreeTextEntry1] : pt presents for f/u for treatment options for osteoporosis and  her 7th Prolia shot.\par She did not have any treatment for osteoporosis since 2019 [de-identified] : She recently slipped and fell and suffered a small fracture along her spine- now recovered.\par She wants to resume treatment for osteoporosis with Prolia.\par \par She is followed by PCP Dr. Sultana.\par \par Last DEXA was more than 3 years ago\par She takes Calcium and vitamin D daily and remains active\par

## 2022-10-07 ENCOUNTER — NON-APPOINTMENT (OUTPATIENT)
Age: 72
End: 2022-10-07

## 2022-10-07 ENCOUNTER — APPOINTMENT (OUTPATIENT)
Dept: CARDIOLOGY | Facility: CLINIC | Age: 72
End: 2022-10-07

## 2022-10-07 VITALS
OXYGEN SATURATION: 99 % | HEART RATE: 56 BPM | DIASTOLIC BLOOD PRESSURE: 82 MMHG | WEIGHT: 158 LBS | HEIGHT: 64 IN | BODY MASS INDEX: 26.98 KG/M2 | SYSTOLIC BLOOD PRESSURE: 124 MMHG

## 2022-10-07 PROCEDURE — 93000 ELECTROCARDIOGRAM COMPLETE: CPT

## 2022-10-07 PROCEDURE — 99214 OFFICE O/P EST MOD 30 MIN: CPT

## 2022-10-07 NOTE — ASSESSMENT
[FreeTextEntry1] : 1.  Hypertension well-controlled on losartan 100 hydrochlorthiazide and atenolol 25\par \par 2.  Paroxysmal atrial fibrillation patient remains in normal sinus rhythm and tolerating atenolol , off Eliquis and just on aspirin.  No recurrence of atrial fibrillation.  Patient continues to be\par \par 3.  Normal noninvasive cardiac work-up in November 2020 including echo and nuclear stress test\par \par 4.  Minor plaque in the carotid artery nonobstructive\par \par Overall the patient is stable without symptoms looks well and blood pressure is under reasonable control.  She has a mildly abnormal EKG with some T inversions laterally.  There is been no change in his status.  The only abnormality on her bloods with some elevation of alkaline phosphatase which will be followed up by Dr. Johnson

## 2022-10-07 NOTE — HISTORY OF PRESENT ILLNESS
[FreeTextEntry1] : Cait is 72 years old with a history of hypertension otherwise in good health without significant hospitalizations\par \par She was admitted to [facility] at the end of November 2020 with palpitations and chest pain and abnormal troponin.  She was in atrial fibrillation with a rapid ventricular response and was placed on beta-blocker and reverted to normal sinus rhythm and the chest pain resolved\par \par Noninvasive work-up included\par Echocardiogram with normal LA size normal LV and RV function and no valvular disease\par \par Exercise nuclear stress test within normal limits normal ejection fraction no evidence of ischemia or infarction\par \par Since discharge she is remained in normal sinus rhythm\par \par \par She denies shortness of breath chest pain palpitations dizziness syncope edema orthopnea PND she feels well and tolerating her present regimen of medications\par \par Since last visit she has undergone 2 endoscopies and apparently had a fungus infection which was treated and now she is clear\par \par She has had no further episodes of palpitations chest pain shortness of breath.  She is on stable medications and is no longer on Eliquis but is on aspirin 325\par \par Continues to feel well.  She also sees Dr. Jorje Martini who repeated her echocardiogram which was normal and is done carotid Doppler with some minor plaque and has scheduled her for a CT coronary score.\par \par Visit October 7, 2022: Patient offers no complaints of shortness of breath chest pain palpitations dizziness or syncope.  She denies edema orthopnea PND.  She never had a CT coronary calcium score that was recommended.  She maintains on same medication atenolol and aspirin.\par \par She overall feels well and denies chest pain chest pressure shortness of breath.  She tries to be careful with the salt in her diet.  She has no palpitations\par   \par Since last visit several months ago, she has felt well without chest pain chest pressure shortness of breath palpitations dizziness or syncope.  She is on stable medications for her hypertension and her blood pressure has been well controlled\par \par ECG from Dr. Robledo  office February 2, 2022 normal sinus rhythm T inversions 1 aVL V2 V5 and V6 unchanged.  These changes are nonspecific\par \par   EKG October 7, 2022 normal sinus rhythm T inversions in the inferolateral leads unchanged from prior EKG possibly compatible with LVH\par \par  recent blood tests alkaline phosphatase somewhat elevated 202 otherwise normal liver function tests normal renal function

## 2022-10-07 NOTE — DISCUSSION/SUMMARY
[FreeTextEntry1] :  patient will continue on her present regimen medications.  I renewed her atenolol.  No further cardiac work-up needed presently\par \par  s he will follow-up with   Concerning elevation of alkaline phosphatase [EKG obtained to assist in diagnosis and management of assessed problem(s)] : EKG obtained to assist in diagnosis and management of assessed problem(s)

## 2023-03-15 ENCOUNTER — APPOINTMENT (OUTPATIENT)
Dept: INTERNAL MEDICINE | Facility: CLINIC | Age: 73
End: 2023-03-15
Payer: MEDICARE

## 2023-03-15 PROCEDURE — 36415 COLL VENOUS BLD VENIPUNCTURE: CPT

## 2023-03-15 NOTE — H&P ADULT - PROBLEM/PLAN-1
DISPLAY PLAN FREE TEXT PAST MEDICAL HISTORY:  Acute kidney injury     Cervical radiculopathy     Cushing's syndrome     Diabetes     Fibromyalgia     Gastroparesis     GERD (gastroesophageal reflux disease)     Hypothyroidism     Kidney stone left    Migraine     MVP (mitral valve prolapse)     Neurogenic bladder     Osteoarthritis     Raynaud's disease     Reflex sympathetic dystrophy     Thyroiditis hashimottos    TMJ (dislocation of temporomandibular joint)

## 2023-03-20 LAB
25(OH)D3 SERPL-MCNC: 70.2 NG/ML
ALBUMIN SERPL ELPH-MCNC: 4.2 G/DL
ALP BLD-CCNC: 70 U/L
ALP BONE SERPL-MCNC: 11.3 UG/L
ALT SERPL-CCNC: 17 U/L
ANION GAP SERPL CALC-SCNC: 10 MMOL/L
AST SERPL-CCNC: 19 U/L
BILIRUB SERPL-MCNC: 0.4 MG/DL
BUN SERPL-MCNC: 24 MG/DL
CALCIUM SERPL-MCNC: 9.9 MG/DL
CHLORIDE SERPL-SCNC: 101 MMOL/L
CO2 SERPL-SCNC: 29 MMOL/L
CREAT SERPL-MCNC: 0.71 MG/DL
EGFR: 89 ML/MIN/1.73M2
GLUCOSE SERPL-MCNC: 107 MG/DL
POTASSIUM SERPL-SCNC: 3.7 MMOL/L
PROT SERPL-MCNC: 6.8 G/DL
SODIUM SERPL-SCNC: 140 MMOL/L

## 2023-03-22 ENCOUNTER — APPOINTMENT (OUTPATIENT)
Dept: INTERNAL MEDICINE | Facility: CLINIC | Age: 73
End: 2023-03-22
Payer: MEDICARE

## 2023-03-22 VITALS
WEIGHT: 150 LBS | BODY MASS INDEX: 25.61 KG/M2 | SYSTOLIC BLOOD PRESSURE: 120 MMHG | DIASTOLIC BLOOD PRESSURE: 80 MMHG | HEIGHT: 64 IN

## 2023-03-22 PROCEDURE — 99213 OFFICE O/P EST LOW 20 MIN: CPT | Mod: 25

## 2023-03-22 PROCEDURE — 96372 THER/PROPH/DIAG INJ SC/IM: CPT

## 2023-03-22 RX ORDER — DENOSUMAB 60 MG/ML
60 INJECTION SUBCUTANEOUS
Qty: 1 | Refills: 0 | Status: COMPLETED | OUTPATIENT
Start: 2023-03-22

## 2023-03-22 RX ADMIN — DENOSUMAB 0 MG/ML: 60 INJECTION SUBCUTANEOUS at 00:00

## 2023-03-22 NOTE — PHYSICAL EXAM
[No Acute Distress] : no acute distress [Well Nourished] : well nourished [Well Developed] : well developed [Well-Appearing] : well-appearing [No CVA Tenderness] : no CVA  tenderness [No Spinal Tenderness] : no spinal tenderness [No Joint Swelling] : no joint swelling [Grossly Normal Strength/Tone] : grossly normal strength/tone [Coordination Grossly Intact] : coordination grossly intact [No Focal Deficits] : no focal deficits [Normal Gait] : normal gait [Deep Tendon Reflexes (DTR)] : deep tendon reflexes were 2+ and symmetric [Normal Affect] : the affect was normal [Normal Insight/Judgement] : insight and judgment were intact

## 2023-03-22 NOTE — HISTORY OF PRESENT ILLNESS
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 Sultana.\par \par Last DEXA was 09/22  T scores were -2.5 in L spine and -2.4 in femoral neck of hip\par She takes Calcium and vitamin D daily and remains active\par

## 2023-04-21 ENCOUNTER — APPOINTMENT (OUTPATIENT)
Dept: CARDIOLOGY | Facility: CLINIC | Age: 73
End: 2023-04-21
Payer: MEDICARE

## 2023-04-21 ENCOUNTER — NON-APPOINTMENT (OUTPATIENT)
Age: 73
End: 2023-04-21

## 2023-04-21 VITALS
BODY MASS INDEX: 25.61 KG/M2 | WEIGHT: 150 LBS | HEART RATE: 57 BPM | DIASTOLIC BLOOD PRESSURE: 75 MMHG | HEIGHT: 64 IN | SYSTOLIC BLOOD PRESSURE: 118 MMHG | OXYGEN SATURATION: 97 %

## 2023-04-21 DIAGNOSIS — R94.31 ABNORMAL ELECTROCARDIOGRAM [ECG] [EKG]: ICD-10-CM

## 2023-04-21 DIAGNOSIS — E78.5 HYPERLIPIDEMIA, UNSPECIFIED: ICD-10-CM

## 2023-04-21 DIAGNOSIS — R74.8 ABNORMAL LEVELS OF OTHER SERUM ENZYMES: ICD-10-CM

## 2023-04-21 PROCEDURE — 93000 ELECTROCARDIOGRAM COMPLETE: CPT

## 2023-04-21 PROCEDURE — 99214 OFFICE O/P EST MOD 30 MIN: CPT

## 2023-04-21 NOTE — HISTORY OF PRESENT ILLNESS
[FreeTextEntry1] : Cait is 73 years old with a history of hypertension otherwise in good health without significant hospitalizations\par \par She was admitted to [facility] at the end of November 2020 with palpitations and chest pain and abnormal troponin.  She was in atrial fibrillation with a rapid ventricular response and was placed on beta-blocker and reverted to normal sinus rhythm and the chest pain resolved\par \par Noninvasive work-up included\par Echocardiogram with normal LA size normal LV and RV function and no valvular disease\par \par Exercise nuclear stress test within normal limits normal ejection fraction no evidence of ischemia or infarction\par \par Since discharge she is remained in normal sinus rhythm\par \par \par She denies shortness of breath chest pain palpitations dizziness syncope edema orthopnea PND she feels well and tolerating her present regimen of medications\par \par Since last visit she has undergone 2 endoscopies and apparently had a fungus infection which was treated and now she is clear\par \par She has had no further episodes of palpitations chest pain shortness of breath.  She is on stable medications and is no longer on Eliquis but is on aspirin 325\par \par Continues to feel well.  She also sees Dr. Jorje Martini who repeated her echocardiogram which was normal and is done carotid Doppler with some minor plaque and has scheduled her for a CT coronary score.\par \par Visit October 7, 2022: Patient offers no complaints of shortness of breath chest pain palpitations dizziness or syncope.  She denies edema orthopnea PND.  She never had a CT coronary calcium score that was recommended.  She maintains on same medication atenolol and aspirin.\par \par She overall feels well and denies chest pain chest pressure shortness of breath.  She tries to be careful with the salt in her diet.  She has no palpitations\par   \par Since last visit several months ago, she has felt well without chest pain chest pressure shortness of breath palpitations dizziness or syncope.  She is on stable medications for her hypertension and her blood pressure has been well controlled\par \par ECG from Dr. Robledo  office February 2, 2022 normal sinus rhythm T inversions 1 aVL V2 V5 and V6 unchanged.  These changes are nonspecific\par \par   EKG October 7, 2022 normal sinus rhythm T inversions in the inferolateral leads unchanged from prior EKG possibly compatible with LVH\par \par  recent blood tests alkaline phosphatase somewhat elevated 202 otherwise normal liver function tests normal renal function\par \par  visit April 21, 2023: Patient feels well since the last visit.  She has no chest pain chest pressure shortness of breath.  She denies palpitations dizziness or syncope\par \par  her only complaint is occasional some tingling in her left lower extremity.  Apparently she did have some vascular studies and was told they were unremarkable\par \par  EKG April 21, 2023 normal sinus rhythm T inversions laterally unchanged from prior EKG

## 2023-04-21 NOTE — DISCUSSION/SUMMARY
[FreeTextEntry1] :  patient's cardiac status is stable and she should continue on her present regimen of medication.  Routine follow-up again in 6 months.  No further cardiac work-up needed at present [EKG obtained to assist in diagnosis and management of assessed problem(s)] : EKG obtained to assist in diagnosis and management of assessed problem(s)

## 2023-04-21 NOTE — ASSESSMENT
[FreeTextEntry1] : 1.  Hypertension well-controlled on losartan 100 hydrochlorthiazide and atenolol 25 and loartan\par \par 2.  Paroxysmal atrial fibrillation patient remains in normal sinus rhythm and tolerating atenolol , off Eliquis and just on aspirin.  No recurrence of atrial fibrillation.   no clinical recurrence of atrial fibrillation\par \par 3.  Normal noninvasive cardiac work-up in November 2020 including echo and nuclear stress test\par \par 4.  Minor plaque in the carotid artery nonobstructive\par \par Overall the patient is stable without symptoms looks well and blood pressure is under reasonable control.  She has a mildly abnormal EKG with some T inversions laterally.  There is been no change in hier status.  r

## 2023-05-04 NOTE — H&P ADULT - ASSESSMENT
DC Planning      Spoke with Dr. Shweta Cronin office f/u made 5/15/23 @ 1 pm. Pt agrees with date/time.
DC Planning    Spoke with pt at bedside, currently on RA 96%. Does not qualify for 02 per primary nurse. Pt plans on going home with spouse. Declines [facility]. She does go to [facility] for chemo-should complete in June. Eliquis is a home med. Will make f/u appt with Dr. Yazmin Pack is closed for lunch currently.
Discharge planning    Patient currently on 30l/40% FIO2. Plan for possible thoracentesis today. Continue to follow.  PT/OT to eval.
71 yo woman with PMH of GIST, osteoperosis, HTN presenting with chest pain and new onset atrial fibrillation w/o EKG changes w/ tropinemia concern for NSTEMI

## 2023-09-05 ENCOUNTER — NON-APPOINTMENT (OUTPATIENT)
Age: 73
End: 2023-09-05

## 2023-09-18 ENCOUNTER — APPOINTMENT (OUTPATIENT)
Dept: INTERNAL MEDICINE | Facility: CLINIC | Age: 73
End: 2023-09-18

## 2023-09-22 ENCOUNTER — APPOINTMENT (OUTPATIENT)
Dept: INTERNAL MEDICINE | Facility: CLINIC | Age: 73
End: 2023-09-22
Payer: MEDICARE

## 2023-09-22 PROCEDURE — 36415 COLL VENOUS BLD VENIPUNCTURE: CPT

## 2023-09-25 ENCOUNTER — APPOINTMENT (OUTPATIENT)
Dept: INTERNAL MEDICINE | Facility: CLINIC | Age: 73
End: 2023-09-25
Payer: MEDICARE

## 2023-09-25 VITALS
OXYGEN SATURATION: 98 % | BODY MASS INDEX: 26.98 KG/M2 | HEART RATE: 56 BPM | WEIGHT: 158 LBS | TEMPERATURE: 98.3 F | DIASTOLIC BLOOD PRESSURE: 81 MMHG | SYSTOLIC BLOOD PRESSURE: 164 MMHG | HEIGHT: 64 IN

## 2023-09-25 LAB
ALBUMIN SERPL ELPH-MCNC: 4.4 G/DL
ALP BLD-CCNC: 57 U/L
ALT SERPL-CCNC: 14 U/L
ANION GAP SERPL CALC-SCNC: 9 MMOL/L
AST SERPL-CCNC: 18 U/L
BILIRUB SERPL-MCNC: 1 MG/DL
BUN SERPL-MCNC: 18 MG/DL
CALCIUM SERPL-MCNC: 10 MG/DL
CHLORIDE SERPL-SCNC: 103 MMOL/L
CO2 SERPL-SCNC: 29 MMOL/L
CREAT SERPL-MCNC: 0.67 MG/DL
EGFR: 91 ML/MIN/1.73M2
GLUCOSE SERPL-MCNC: 100 MG/DL
POTASSIUM SERPL-SCNC: 4.6 MMOL/L
PROT SERPL-MCNC: 6.5 G/DL
SODIUM SERPL-SCNC: 141 MMOL/L

## 2023-09-25 PROCEDURE — 99213 OFFICE O/P EST LOW 20 MIN: CPT | Mod: 25

## 2023-09-25 PROCEDURE — 96372 THER/PROPH/DIAG INJ SC/IM: CPT

## 2023-09-25 RX ORDER — DENOSUMAB 60 MG/ML
60 INJECTION SUBCUTANEOUS
Qty: 1 | Refills: 0 | Status: COMPLETED | OUTPATIENT
Start: 2023-09-25

## 2023-09-25 RX ADMIN — DENOSUMAB 0 MG/ML: 60 INJECTION SUBCUTANEOUS at 00:00

## 2023-10-24 ENCOUNTER — APPOINTMENT (OUTPATIENT)
Dept: CARDIOLOGY | Facility: CLINIC | Age: 73
End: 2023-10-24
Payer: MEDICARE

## 2023-10-24 VITALS
BODY MASS INDEX: 26.09 KG/M2 | DIASTOLIC BLOOD PRESSURE: 82 MMHG | OXYGEN SATURATION: 98 % | WEIGHT: 152 LBS | SYSTOLIC BLOOD PRESSURE: 155 MMHG | HEART RATE: 52 BPM

## 2023-10-24 DIAGNOSIS — E78.00 PURE HYPERCHOLESTEROLEMIA, UNSPECIFIED: ICD-10-CM

## 2023-10-24 DIAGNOSIS — M17.11 UNILATERAL PRIMARY OSTEOARTHRITIS, RIGHT KNEE: ICD-10-CM

## 2023-10-24 DIAGNOSIS — K21.9 GASTRO-ESOPHAGEAL REFLUX DISEASE W/OUT ESOPHAGITIS: ICD-10-CM

## 2023-10-24 DIAGNOSIS — G47.33 OBSTRUCTIVE SLEEP APNEA (ADULT) (PEDIATRIC): ICD-10-CM

## 2023-10-24 DIAGNOSIS — I48.0 PAROXYSMAL ATRIAL FIBRILLATION: ICD-10-CM

## 2023-10-24 PROCEDURE — 99214 OFFICE O/P EST MOD 30 MIN: CPT

## 2023-10-24 RX ORDER — LOSARTAN POTASSIUM 100 MG/1
100 TABLET, FILM COATED ORAL
Qty: 90 | Refills: 3 | Status: ACTIVE | COMMUNITY
Start: 2019-10-30

## 2023-10-24 RX ORDER — ATENOLOL 25 MG/1
25 TABLET ORAL DAILY
Qty: 90 | Refills: 3 | Status: ACTIVE | COMMUNITY
Start: 2020-12-23 | End: 1900-01-01

## 2024-03-20 ENCOUNTER — APPOINTMENT (OUTPATIENT)
Dept: INTERNAL MEDICINE | Facility: CLINIC | Age: 74
End: 2024-03-20

## 2024-03-22 ENCOUNTER — APPOINTMENT (OUTPATIENT)
Dept: INTERNAL MEDICINE | Facility: CLINIC | Age: 74
End: 2024-03-22
Payer: MEDICARE

## 2024-03-22 PROCEDURE — 36415 COLL VENOUS BLD VENIPUNCTURE: CPT

## 2024-03-25 ENCOUNTER — NON-APPOINTMENT (OUTPATIENT)
Age: 74
End: 2024-03-25

## 2024-03-25 DIAGNOSIS — R79.9 ABNORMAL FINDING OF BLOOD CHEMISTRY, UNSPECIFIED: ICD-10-CM

## 2024-03-25 DIAGNOSIS — E87.0 HYPEROSMOLALITY AND HYPERNATREMIA: ICD-10-CM

## 2024-03-25 LAB
ALBUMIN SERPL ELPH-MCNC: 4.9 G/DL
ALP BLD-CCNC: 76 U/L
ALT SERPL-CCNC: 16 U/L
ANION GAP SERPL CALC-SCNC: 32 MMOL/L
AST SERPL-CCNC: 22 U/L
BILIRUB SERPL-MCNC: 0.9 MG/DL
BUN SERPL-MCNC: 22 MG/DL
CALCIUM SERPL-MCNC: 9.7 MG/DL
CHLORIDE SERPL-SCNC: 101 MMOL/L
CO2 SERPL-SCNC: 18 MMOL/L
CREAT SERPL-MCNC: 0.69 MG/DL
EGFR: 90 ML/MIN/1.73M2
GLUCOSE SERPL-MCNC: 43 MG/DL
POTASSIUM SERPL-SCNC: 4.5 MMOL/L
PROT SERPL-MCNC: 7.3 G/DL
SODIUM SERPL-SCNC: 151 MMOL/L

## 2024-04-01 ENCOUNTER — APPOINTMENT (OUTPATIENT)
Dept: INTERNAL MEDICINE | Facility: CLINIC | Age: 74
End: 2024-04-01
Payer: MEDICARE

## 2024-04-01 ENCOUNTER — APPOINTMENT (OUTPATIENT)
Dept: INTERNAL MEDICINE | Facility: CLINIC | Age: 74
End: 2024-04-01

## 2024-04-01 LAB
ALBUMIN SERPL ELPH-MCNC: 4.3 G/DL
ALP BLD-CCNC: 75 U/L
ALT SERPL-CCNC: 15 U/L
ANION GAP SERPL CALC-SCNC: 10 MMOL/L
AST SERPL-CCNC: 18 U/L
BILIRUB SERPL-MCNC: 0.7 MG/DL
BUN SERPL-MCNC: 15 MG/DL
CALCIUM SERPL-MCNC: 10.2 MG/DL
CHLORIDE SERPL-SCNC: 99 MMOL/L
CO2 SERPL-SCNC: 30 MMOL/L
CREAT SERPL-MCNC: 0.71 MG/DL
EGFR: 88 ML/MIN/1.73M2
GLUCOSE SERPL-MCNC: 93 MG/DL
POTASSIUM SERPL-SCNC: 3.7 MMOL/L
PROT SERPL-MCNC: 7.4 G/DL
SODIUM SERPL-SCNC: 140 MMOL/L

## 2024-04-01 PROCEDURE — 36415 COLL VENOUS BLD VENIPUNCTURE: CPT

## 2024-04-10 ENCOUNTER — APPOINTMENT (OUTPATIENT)
Dept: INTERNAL MEDICINE | Facility: CLINIC | Age: 74
End: 2024-04-10
Payer: MEDICARE

## 2024-04-10 VITALS
BODY MASS INDEX: 26.12 KG/M2 | HEIGHT: 64 IN | DIASTOLIC BLOOD PRESSURE: 89 MMHG | OXYGEN SATURATION: 98 % | HEART RATE: 66 BPM | SYSTOLIC BLOOD PRESSURE: 162 MMHG | WEIGHT: 153 LBS

## 2024-04-10 VITALS — DIASTOLIC BLOOD PRESSURE: 78 MMHG | SYSTOLIC BLOOD PRESSURE: 148 MMHG

## 2024-04-10 DIAGNOSIS — I10 ESSENTIAL (PRIMARY) HYPERTENSION: ICD-10-CM

## 2024-04-10 DIAGNOSIS — M81.0 AGE-RELATED OSTEOPOROSIS W/OUT CURRENT PATHOLOGICAL FRACTURE: ICD-10-CM

## 2024-04-10 PROCEDURE — 96372 THER/PROPH/DIAG INJ SC/IM: CPT

## 2024-04-10 PROCEDURE — 99213 OFFICE O/P EST LOW 20 MIN: CPT | Mod: 25

## 2024-04-10 RX ORDER — DENOSUMAB 60 MG/ML
60 INJECTION SUBCUTANEOUS
Qty: 0 | Refills: 0 | Status: COMPLETED | OUTPATIENT
Start: 2024-04-10

## 2024-04-10 RX ADMIN — DENOSUMAB 0 MG/ML: 60 INJECTION SUBCUTANEOUS at 00:00

## 2024-04-10 NOTE — PLAN
[FreeTextEntry1] : BP was borderline high--pt reports long h/o white coat hypertension--she i compliant with Losartan She will f/u with her PCP

## 2024-04-10 NOTE — HISTORY OF PRESENT ILLNESS
[FreeTextEntry1] : pt presents for Prolia shot #11 for osteoporosis [de-identified] : She is tolerating treatment for osteoporosis with Prolia. 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  She is followed by PCP Dr. Sultana.  Last DEXA was 09/22  T scores were -2.5 in L spine and -2.4 in femoral neck of hip She takes Calcium and vitamin D daily and remains active

## 2024-05-14 ENCOUNTER — APPOINTMENT (OUTPATIENT)
Dept: CARDIOLOGY | Facility: CLINIC | Age: 74
End: 2024-05-14

## 2024-05-28 ENCOUNTER — APPOINTMENT (OUTPATIENT)
Dept: CARDIOLOGY | Facility: CLINIC | Age: 74
End: 2024-05-28

## 2024-07-26 ENCOUNTER — NON-APPOINTMENT (OUTPATIENT)
Age: 74
End: 2024-07-26

## 2024-07-26 ENCOUNTER — APPOINTMENT (OUTPATIENT)
Dept: CARDIOLOGY | Facility: CLINIC | Age: 74
End: 2024-07-26
Payer: MEDICARE

## 2024-07-26 VITALS
DIASTOLIC BLOOD PRESSURE: 87 MMHG | HEIGHT: 64 IN | HEART RATE: 51 BPM | BODY MASS INDEX: 26.8 KG/M2 | OXYGEN SATURATION: 97 % | SYSTOLIC BLOOD PRESSURE: 164 MMHG | WEIGHT: 157 LBS

## 2024-07-26 DIAGNOSIS — I10 ESSENTIAL (PRIMARY) HYPERTENSION: ICD-10-CM

## 2024-07-26 DIAGNOSIS — R10.9 UNSPECIFIED ABDOMINAL PAIN: ICD-10-CM

## 2024-07-26 DIAGNOSIS — R94.31 ABNORMAL ELECTROCARDIOGRAM [ECG] [EKG]: ICD-10-CM

## 2024-07-26 DIAGNOSIS — I48.0 PAROXYSMAL ATRIAL FIBRILLATION: ICD-10-CM

## 2024-07-26 DIAGNOSIS — E55.9 VITAMIN D DEFICIENCY, UNSPECIFIED: ICD-10-CM

## 2024-07-26 DIAGNOSIS — G47.33 OBSTRUCTIVE SLEEP APNEA (ADULT) (PEDIATRIC): ICD-10-CM

## 2024-07-26 PROCEDURE — G2211 COMPLEX E/M VISIT ADD ON: CPT

## 2024-07-26 PROCEDURE — 99214 OFFICE O/P EST MOD 30 MIN: CPT

## 2024-07-26 PROCEDURE — 93000 ELECTROCARDIOGRAM COMPLETE: CPT

## 2024-07-26 NOTE — ASSESSMENT
[FreeTextEntry1] : 1.. Hypertension well-controlled on losartan 100 hydrochlorthiazide and atenolol 25  2. Paroxysmal atrial fibrillation patient remains in normal sinus rhythm and tolerating atenolol , off Eliquis and just on aspirin. No recurrence of atrial fibrillation. no clinical recurrence of atrial fibrillation 3. Normal noninvasive cardiac work-up in November 2020 including echo and nuclear stress test 4. Minor plaque in the carotid artery nonobstructive  Patient remain stable since her last visit without any cardiac symptoms remaining in normal sinus rhythm with a well-controlled blood pressure.  She remains overweight but there are no active cardiac issues..  She has some labile hypertension but later in the exam blood pressure was normal

## 2024-07-26 NOTE — HISTORY OF PRESENT ILLNESS
[FreeTextEntry1] : Cait is 74 years old with a history of hypertension otherwise in good health without significant hospitalizations  She was admitted to [facility] at the end of November 2020 with palpitations and chest pain and abnormal troponin.  She was in atrial fibrillation with a rapid ventricular response and was placed on beta-blocker and reverted to normal sinus rhythm and the chest pain resolved  Noninvasive work-up included Echocardiogram with normal LA size normal LV and RV function and no valvular disease  Exercise nuclear stress test within normal limits normal ejection fraction no evidence of ischemia or infarction Since discharge she is remained in normal sinus rhythm She denies shortness of breath chest pain palpitations dizziness syncope edema orthopnea PND she feels well and tolerating her present regimen of medications  Since last visit she has undergone 2 endoscopies and apparently had a fungus infection which was treated and now she is clear  She has had no further episodes of palpitations chest pain shortness of breath.  She is on stable medications and is no longer on Eliquis but is on aspirin 325  Continues to feel well.  She also sees Dr. Jorje Martini who repeated her echocardiogram which was normal and is done carotid Doppler with some minor plaque and has scheduled her for a CT coronary score.  Visit October 7, 2022: Patient offers no complaints of shortness of breath chest pain palpitations dizziness or syncope.  She denies edema orthopnea PND.  She never had a CT coronary calcium score that was recommended.  She maintains on same medication atenolol and aspirin.  She overall feels well and denies chest pain chest pressure shortness of breath.  She tries to be careful with the salt in her diet.  She has no palpitations    Since last visit several months ago, she has felt well without chest pain chest pressure shortness of breath palpitations dizziness or syncope.  She is on stable medications for her hypertension and her blood pressure has been well controlled  ECG from Dr. Robledo  office February 2, 2022 normal sinus rhythm T inversions 1 aVL V2 V5 and V6 unchanged.  These changes are nonspecific    EKG October 7, 2022 normal sinus rhythm T inversions in the inferolateral leads unchanged from prior EKG possibly compatible with LVH   recent blood tests alkaline phosphatase somewhat elevated 202 otherwise normal liver function tests normal renal function   visit April 21, 2023: Patient feels well since the last visit.  She has no chest pain chest pressure shortness of breath.  She denies palpitations dizziness or syncope   her only complaint is occasional some tingling in her left lower extremity.  Apparently she did have some vascular studies and was told they were unremarkable   EKG April 21, 2023 normal sinus rhythm T inversions laterally unchanged from prior EKG  Visit October 24, 2023: Patient feels well.  She denies palpitations chest pain shortness of breath.  She remains on stable medication according to her she is on losartan 100/25 atenolol 25 aspirin 325.  She has reasonable exercise tolerance but still remains overweight.  She recently had some swelling of her left lower extremity was treated with penicillin with almost complete resolution though there is still some minor swelling in the left lower extremity  Visit July 26, 2024 Since last visit, there has been no change in her status.  She denies chest pain chest pressure shortness of breath.  She gets some occasional edema of her ankles she remains on stable medications for hypertension.  She has had no clinical recurrence of atrial fibrillation no palpitations.  EKG July 26, 2024 normal sinus rhythm sinus bradycardia nonspecific ST-T wave changes no change from prior ECG

## 2024-07-26 NOTE — PHYSICAL EXAM
[Normal Appearance] : normal appearance [General Appearance - Well Nourished] : well nourished [General Appearance - In No Acute Distress] : no acute distress [Normal Conjunctiva] : the conjunctiva exhibited no abnormalities [No Jugular Venous Rivera A Waves] : no jugular venous rivera A waves [Respiration, Rhythm And Depth] : normal respiratory rhythm and effort [Auscultation Breath Sounds / Voice Sounds] : lungs were clear to auscultation bilaterally [Heart Sounds] : normal S1 and S2 [Murmurs] : no murmurs present [Abdomen Soft] : soft [Abdomen Tenderness] : non-tender [Abnormal Walk] : normal gait [Cyanosis, Localized] : no localized cyanosis [FreeTextEntry1] : Pulses 2+ dorsalis pedis no edema left lower extremity slight swelling but not pitting right lower extremity no edema

## 2024-07-26 NOTE — DISCUSSION/SUMMARY
[FreeTextEntry1] : 1.  Continue present regimen medication 2.  Increase level of exercise try to walk at a rapid pace but she is otherwise active still working at age 74 3.  Continue to be careful with salt in her diet  Routine follow-up again in 6 months.  Overall cardiac status is quite stable [EKG obtained to assist in diagnosis and management of assessed problem(s)] : EKG obtained to assist in diagnosis and management of assessed problem(s)

## 2024-07-26 NOTE — REASON FOR VISIT
[FreeTextEntry1] : Cait habernard 74 years old here for follow-up of her cardiac status.  She was seen on July 26, 2024

## 2024-10-09 ENCOUNTER — APPOINTMENT (OUTPATIENT)
Dept: INTERNAL MEDICINE | Facility: CLINIC | Age: 74
End: 2024-10-09
Payer: MEDICARE

## 2024-10-09 PROCEDURE — 36415 COLL VENOUS BLD VENIPUNCTURE: CPT

## 2024-10-16 ENCOUNTER — APPOINTMENT (OUTPATIENT)
Dept: INTERNAL MEDICINE | Facility: CLINIC | Age: 74
End: 2024-10-16
Payer: MEDICARE

## 2024-10-16 VITALS
TEMPERATURE: 97.9 F | HEIGHT: 64 IN | BODY MASS INDEX: 28.17 KG/M2 | OXYGEN SATURATION: 98 % | HEART RATE: 55 BPM | DIASTOLIC BLOOD PRESSURE: 74 MMHG | WEIGHT: 165 LBS | SYSTOLIC BLOOD PRESSURE: 167 MMHG

## 2024-10-16 DIAGNOSIS — R26.89 OTHER ABNORMALITIES OF GAIT AND MOBILITY: ICD-10-CM

## 2024-10-16 DIAGNOSIS — M81.0 AGE-RELATED OSTEOPOROSIS W/OUT CURRENT PATHOLOGICAL FRACTURE: ICD-10-CM

## 2024-10-16 PROCEDURE — 99213 OFFICE O/P EST LOW 20 MIN: CPT | Mod: 25

## 2024-10-16 PROCEDURE — 96372 THER/PROPH/DIAG INJ SC/IM: CPT

## 2024-10-16 RX ORDER — DENOSUMAB 60 MG/ML
60 INJECTION SUBCUTANEOUS
Qty: 0 | Refills: 0 | Status: COMPLETED | OUTPATIENT
Start: 2024-10-16

## 2024-10-16 RX ADMIN — DENOSUMAB 0 MG/ML: 60 INJECTION SUBCUTANEOUS at 00:00

## 2024-10-27 ENCOUNTER — INPATIENT (INPATIENT)
Facility: HOSPITAL | Age: 74
LOS: 0 days | Discharge: HOME | End: 2024-10-28
Attending: STUDENT IN AN ORGANIZED HEALTH CARE EDUCATION/TRAINING PROGRAM | Admitting: STUDENT IN AN ORGANIZED HEALTH CARE EDUCATION/TRAINING PROGRAM
Payer: MEDICARE

## 2024-10-27 VITALS
TEMPERATURE: 98 F | DIASTOLIC BLOOD PRESSURE: 89 MMHG | OXYGEN SATURATION: 98 % | RESPIRATION RATE: 16 BRPM | HEIGHT: 64 IN | SYSTOLIC BLOOD PRESSURE: 168 MMHG | HEART RATE: 68 BPM | WEIGHT: 154.98 LBS

## 2024-10-27 DIAGNOSIS — K56.609 UNSPECIFIED INTESTINAL OBSTRUCTION, UNSPECIFIED AS TO PARTIAL VERSUS COMPLETE OBSTRUCTION: ICD-10-CM

## 2024-10-27 LAB
ALBUMIN SERPL ELPH-MCNC: 4.2 G/DL — SIGNIFICANT CHANGE UP (ref 3.3–5)
ALP SERPL-CCNC: 62 U/L — SIGNIFICANT CHANGE UP (ref 40–120)
ALT FLD-CCNC: 17 U/L — SIGNIFICANT CHANGE UP (ref 4–33)
ANION GAP SERPL CALC-SCNC: 14 MMOL/L — SIGNIFICANT CHANGE UP (ref 7–14)
APPEARANCE UR: ABNORMAL
AST SERPL-CCNC: 23 U/L — SIGNIFICANT CHANGE UP (ref 4–32)
BASOPHILS # BLD AUTO: 0.04 K/UL — SIGNIFICANT CHANGE UP (ref 0–0.2)
BASOPHILS NFR BLD AUTO: 0.4 % — SIGNIFICANT CHANGE UP (ref 0–2)
BILIRUB SERPL-MCNC: 0.9 MG/DL — SIGNIFICANT CHANGE UP (ref 0.2–1.2)
BILIRUB UR-MCNC: NEGATIVE — SIGNIFICANT CHANGE UP
BUN SERPL-MCNC: 22 MG/DL — SIGNIFICANT CHANGE UP (ref 7–23)
CALCIUM SERPL-MCNC: 9.6 MG/DL — SIGNIFICANT CHANGE UP (ref 8.4–10.5)
CHLORIDE SERPL-SCNC: 100 MMOL/L — SIGNIFICANT CHANGE UP (ref 98–107)
CO2 SERPL-SCNC: 26 MMOL/L — SIGNIFICANT CHANGE UP (ref 22–31)
COLOR SPEC: YELLOW — SIGNIFICANT CHANGE UP
CREAT SERPL-MCNC: 0.68 MG/DL — SIGNIFICANT CHANGE UP (ref 0.5–1.3)
DIFF PNL FLD: NEGATIVE — SIGNIFICANT CHANGE UP
EGFR: 90 ML/MIN/1.73M2 — SIGNIFICANT CHANGE UP
EOSINOPHIL # BLD AUTO: 0.19 K/UL — SIGNIFICANT CHANGE UP (ref 0–0.5)
EOSINOPHIL NFR BLD AUTO: 2.1 % — SIGNIFICANT CHANGE UP (ref 0–6)
GAS PNL BLDV: SIGNIFICANT CHANGE UP
GLUCOSE SERPL-MCNC: 135 MG/DL — HIGH (ref 70–99)
GLUCOSE UR QL: NEGATIVE MG/DL — SIGNIFICANT CHANGE UP
HCT VFR BLD CALC: 40.7 % — SIGNIFICANT CHANGE UP (ref 34.5–45)
HGB BLD-MCNC: 13.8 G/DL — SIGNIFICANT CHANGE UP (ref 11.5–15.5)
IANC: 7.75 K/UL — HIGH (ref 1.8–7.4)
IMM GRANULOCYTES NFR BLD AUTO: 0.2 % — SIGNIFICANT CHANGE UP (ref 0–0.9)
KETONES UR-MCNC: NEGATIVE MG/DL — SIGNIFICANT CHANGE UP
LEUKOCYTE ESTERASE UR-ACNC: NEGATIVE — SIGNIFICANT CHANGE UP
LIDOCAIN IGE QN: 23 U/L — SIGNIFICANT CHANGE UP (ref 7–60)
LYMPHOCYTES # BLD AUTO: 0.88 K/UL — LOW (ref 1–3.3)
LYMPHOCYTES # BLD AUTO: 9.6 % — LOW (ref 13–44)
MCHC RBC-ENTMCNC: 29.7 PG — SIGNIFICANT CHANGE UP (ref 27–34)
MCHC RBC-ENTMCNC: 33.9 GM/DL — SIGNIFICANT CHANGE UP (ref 32–36)
MCV RBC AUTO: 87.5 FL — SIGNIFICANT CHANGE UP (ref 80–100)
MONOCYTES # BLD AUTO: 0.3 K/UL — SIGNIFICANT CHANGE UP (ref 0–0.9)
MONOCYTES NFR BLD AUTO: 3.3 % — SIGNIFICANT CHANGE UP (ref 2–14)
NEUTROPHILS # BLD AUTO: 7.75 K/UL — HIGH (ref 1.8–7.4)
NEUTROPHILS NFR BLD AUTO: 84.4 % — HIGH (ref 43–77)
NITRITE UR-MCNC: NEGATIVE — SIGNIFICANT CHANGE UP
NRBC # BLD: 0 /100 WBCS — SIGNIFICANT CHANGE UP (ref 0–0)
NRBC # FLD: 0 K/UL — SIGNIFICANT CHANGE UP (ref 0–0)
PH UR: 8 — SIGNIFICANT CHANGE UP (ref 5–8)
PLATELET # BLD AUTO: 121 K/UL — LOW (ref 150–400)
POTASSIUM SERPL-MCNC: 3.3 MMOL/L — LOW (ref 3.5–5.3)
POTASSIUM SERPL-SCNC: 3.3 MMOL/L — LOW (ref 3.5–5.3)
PROT SERPL-MCNC: 7 G/DL — SIGNIFICANT CHANGE UP (ref 6–8.3)
PROT UR-MCNC: SIGNIFICANT CHANGE UP MG/DL
RBC # BLD: 4.65 M/UL — SIGNIFICANT CHANGE UP (ref 3.8–5.2)
RBC # FLD: 13.5 % — SIGNIFICANT CHANGE UP (ref 10.3–14.5)
SODIUM SERPL-SCNC: 140 MMOL/L — SIGNIFICANT CHANGE UP (ref 135–145)
SP GR SPEC: 1.02 — SIGNIFICANT CHANGE UP (ref 1–1.03)
TROPONIN T, HIGH SENSITIVITY RESULT: 7 NG/L — SIGNIFICANT CHANGE UP
TROPONIN T, HIGH SENSITIVITY RESULT: <6 NG/L — SIGNIFICANT CHANGE UP
UROBILINOGEN FLD QL: 0.2 MG/DL — SIGNIFICANT CHANGE UP (ref 0.2–1)
WBC # BLD: 9.18 K/UL — SIGNIFICANT CHANGE UP (ref 3.8–10.5)
WBC # FLD AUTO: 9.18 K/UL — SIGNIFICANT CHANGE UP (ref 3.8–10.5)

## 2024-10-27 PROCEDURE — 99222 1ST HOSP IP/OBS MODERATE 55: CPT | Mod: GC

## 2024-10-27 PROCEDURE — 74018 RADEX ABDOMEN 1 VIEW: CPT | Mod: 26

## 2024-10-27 PROCEDURE — 71045 X-RAY EXAM CHEST 1 VIEW: CPT | Mod: 26

## 2024-10-27 PROCEDURE — 74177 CT ABD & PELVIS W/CONTRAST: CPT | Mod: 26,MC

## 2024-10-27 PROCEDURE — 99285 EMERGENCY DEPT VISIT HI MDM: CPT | Mod: GC

## 2024-10-27 RX ORDER — DIATRIZOATE MEGLUMINE 180 MG/ML
100 INJECTION, SOLUTION INTRAVESICAL ONCE
Refills: 0 | Status: DISCONTINUED | OUTPATIENT
Start: 2024-10-27 | End: 2024-10-28

## 2024-10-27 RX ORDER — ACETAMINOPHEN 500 MG
1000 TABLET ORAL EVERY 6 HOURS
Refills: 0 | Status: COMPLETED | OUTPATIENT
Start: 2024-10-27 | End: 2024-10-28

## 2024-10-27 RX ORDER — ACETAMINOPHEN 500 MG
1000 TABLET ORAL ONCE
Refills: 0 | Status: COMPLETED | OUTPATIENT
Start: 2024-10-27 | End: 2024-10-27

## 2024-10-27 RX ORDER — SODIUM CHLORIDE 9 MG/ML
1000 INJECTION, SOLUTION INTRAMUSCULAR; INTRAVENOUS; SUBCUTANEOUS ONCE
Refills: 0 | Status: COMPLETED | OUTPATIENT
Start: 2024-10-27 | End: 2024-10-27

## 2024-10-27 RX ORDER — ONDANSETRON HYDROCHLORIDE 2 MG/ML
4 INJECTION, SOLUTION INTRAMUSCULAR; INTRAVENOUS ONCE
Refills: 0 | Status: COMPLETED | OUTPATIENT
Start: 2024-10-27 | End: 2024-10-27

## 2024-10-27 RX ORDER — MORPHINE SULFATE 30 MG/1
4 TABLET, EXTENDED RELEASE ORAL ONCE
Refills: 0 | Status: DISCONTINUED | OUTPATIENT
Start: 2024-10-27 | End: 2024-10-27

## 2024-10-27 RX ORDER — ONDANSETRON HYDROCHLORIDE 2 MG/ML
4 INJECTION, SOLUTION INTRAMUSCULAR; INTRAVENOUS EVERY 6 HOURS
Refills: 0 | Status: DISCONTINUED | OUTPATIENT
Start: 2024-10-27 | End: 2024-10-28

## 2024-10-27 RX ORDER — ENOXAPARIN SODIUM 40MG/0.4ML
40 SYRINGE (ML) SUBCUTANEOUS EVERY 24 HOURS
Refills: 0 | Status: DISCONTINUED | OUTPATIENT
Start: 2024-10-27 | End: 2024-10-28

## 2024-10-27 RX ADMIN — Medication 1000 MILLIGRAM(S): at 17:46

## 2024-10-27 RX ADMIN — SODIUM CHLORIDE 1000 MILLILITER(S): 9 INJECTION, SOLUTION INTRAMUSCULAR; INTRAVENOUS; SUBCUTANEOUS at 01:14

## 2024-10-27 RX ADMIN — Medication 400 MILLIGRAM(S): at 17:16

## 2024-10-27 RX ADMIN — ONDANSETRON HYDROCHLORIDE 4 MILLIGRAM(S): 2 INJECTION, SOLUTION INTRAMUSCULAR; INTRAVENOUS at 11:52

## 2024-10-27 RX ADMIN — Medication 400 MILLIGRAM(S): at 23:30

## 2024-10-27 RX ADMIN — Medication 400 MILLIGRAM(S): at 01:11

## 2024-10-27 RX ADMIN — Medication 1000 MILLIGRAM(S): at 12:26

## 2024-10-27 RX ADMIN — Medication 40 MILLIGRAM(S): at 17:16

## 2024-10-27 RX ADMIN — Medication 1000 MILLIGRAM(S): at 23:45

## 2024-10-27 RX ADMIN — ONDANSETRON HYDROCHLORIDE 4 MILLIGRAM(S): 2 INJECTION, SOLUTION INTRAMUSCULAR; INTRAVENOUS at 01:11

## 2024-10-27 RX ADMIN — ONDANSETRON HYDROCHLORIDE 4 MILLIGRAM(S): 2 INJECTION, SOLUTION INTRAMUSCULAR; INTRAVENOUS at 07:02

## 2024-10-27 RX ADMIN — Medication 400 MILLIGRAM(S): at 11:56

## 2024-10-27 RX ADMIN — MORPHINE SULFATE 4 MILLIGRAM(S): 30 TABLET, EXTENDED RELEASE ORAL at 07:02

## 2024-10-27 NOTE — ED PROVIDER NOTE - CLINICAL SUMMARY MEDICAL DECISION MAKING FREE TEXT BOX
75 y/o F woman with PMH of GIST, osteopetrosis, HTN, hernia surgery p/w abd pain. Patient states acute abdominal pain starting earlier today with 2 episodes of nonbloody nonbilious vomiting.  Endorsing abdominal pain to epigastric and mid abdominal region.  States she is not passing gas today.   Vital signs stable, afebrile, not hypoxic. Plan for basic labs, lipase, vbg, CT abd pelvis, pain control  Differential diagnosis includes but not limited to infectious etiology vs. metabolic derangement vs. SBO

## 2024-10-27 NOTE — H&P ADULT - HISTORY OF PRESENT ILLNESS
74F PMH GIST, Osteoporosis, HTN, chest pain and afib who presents with 2 days of abd pain N/V with decreased passing of gas and bowel function. Pt last passed gas 2 days ago, last had BM yesterday. Pt endorses significant surgical history (hysterectomy, GIST wedge, GIST other resections). Pt denies previous SBOs.     In ED: NGT placed

## 2024-10-27 NOTE — ED PROVIDER NOTE - ATTENDING CONTRIBUTION TO CARE
This is a 74-year-old female past medical history of hypertension, A-fib gist with multiple prior surgeries including hysterectomy as well as GIST resections.  Patient presents with nausea vomiting nonbilious nonbloody now with abdominal pain.  States that she is now passing less gas.  Last BM yesterday.  No prior history of small bowel obstruction.  Patient well-appearing she is alert and oriented her abdomen has scarring from previous surgery she has mild generalized tenderness.  Due to significant surgical history will obtain lab work and CT to rule out potential SBO.

## 2024-10-27 NOTE — ED PROVIDER NOTE - PROGRESS NOTE DETAILS
Esvin Burt DO (PGY3)  Patient with low-grade small bowel obstruction with subtle transition point in the mid abdomen. Esvin Burt DO (PGY3)   NG tube was placed, surgery at bedside

## 2024-10-27 NOTE — ED ADULT TRIAGE NOTE - CHIEF COMPLAINT QUOTE
Pt arrives to ED c/o abd pain with N/V starting today.   LBM yesterday, usually has daily BM's.  Hx: stomach hernia surgery and abdominal tumor removal, HTN,

## 2024-10-27 NOTE — H&P ADULT - ATTENDING COMMENTS
I have reviewed the history, pertinent labs and imaging, and discussed the care with the consult resident.  More than 50% of this 55 minute encounter including face to face with the patient was spent counseling and/or coordination of care on SBO.  Time included review of vitals, labs, imaging, discussion with consultants and coordination with the operating room/emergency department.    75yo F with h/o gastrectomy for GIST and DILLON presents with obstructive symptoms. CT with incisional hernia with nonobstructed bowel and evidence of small bowel obstruction with fecalized loops. No evidence of ischemia or closed loop physiology. Likely adhesive in nature.     - NPO, NGT   - IVF   - gastrographin challenge   - may require operative intervention if not improving     The active issues are:  1. sbo    The Acute Care Surgery (B Team) Attending Group Practice:  Dr. Bryanna Rivas    urgent issues - spectra [phone]  nonurgent issues - [phone]  patient appointments or afterhours - [phone] I have reviewed the history, pertinent labs and imaging, and discussed the care with the consult resident.  More than 50% of this 55 minute encounter including face to face with the patient was spent counseling and/or coordination of care on SBO.  Time included review of vitals, labs, imaging, discussion with consultants and coordination with the operating room/emergency department.    73yo F with h/o gastrectomy for GIST and DILLON presents with obstructive symptoms. CT with incisional hernia with nonobstructed bowel and evidence of small bowel obstruction with fecalized loops. No evidence of ischemia or closed loop physiology. Likely adhesive in nature.     - NPO, NGT   - IVF   - gastrographin challenge   - may require operative intervention if not improving   - Pt to follow up with PCP to obtain MRI of liver to evaluate foci noted on CT     The active issues are:  1. sbo    The Acute Care Surgery (B Team) Attending Group Practice:  Dr. Bryanna Rivas    urgent issues - spectra [phone]  nonurgent issues - [phone]  patient appointments or afterhours - [phone]

## 2024-10-27 NOTE — PATIENT PROFILE ADULT - FALL HARM RISK - RISK INTERVENTIONS
Assistance with ambulation/Communicate Fall Risk and Risk Factors to all staff, patient, and family/Reinforce activity limits and safety measures with patient and family/Visual Cue: Yellow wristband/Bed in lowest position, wheels locked, appropriate side rails in place/Call bell, personal items and telephone in reach/Instruct patient to call for assistance before getting out of bed or chair/Non-slip footwear when patient is out of bed/[place] to call system/Physically safe environment - no spills, clutter or unnecessary equipment/Purposeful Proactive Rounding/Room/bathroom lighting operational, light cord in reach

## 2024-10-27 NOTE — ED PROCEDURE NOTE - EBL
2 Person skin assessment - Skin intact with exception of ostomy on abdomen and midline incision where dressing was changed at [facility] 81. this afternoon - verified with Alexandra Cisneros RN 0

## 2024-10-27 NOTE — H&P ADULT - NSHPPHYSICALEXAM_GEN_ALL_CORE
PHYSICAL EXAM:  GENERAL: NAD, well-developed  HEAD:  Atraumatic, Normocephalic, NGT to LCWS (clamped while checking location of tip)  EYES: EOMI, conjunctiva and sclera clear  NECK: Supple, No JVD  CHEST/LUNG: No distress, speaks in full sentences, on RA  HEART: WWP, mentating  ABDOMEN: Soft, Nontender, mildly distended;   PSYCH: AAOx3  NEUROLOGY: non-focal  SKIN: No rashes or lesions

## 2024-10-27 NOTE — ED ADULT NURSE NOTE - OBJECTIVE STATEMENT
Break RN: Pt is a 75 y/o Female, A&Ox4, ambulatory with a PHx of GIST, HTN, hernia surgery. Pt presents to the ED with c/o abdominal pain, nausea and vomiting x 1 day. Pt reports 3 episodes of vomiting today. Pt also reports LBM was yesterday but states she normally has regular bowel movements daily. Respirations even and unlabored, chest rise equal b/l. Pt denies chest pain, SOB, fever, cough, chills, diarrhea, bloody emesis, h/a, dizziness, numbness/tingling or any urinary symptoms at this time. 20g IV placed in RAC. Labs collected and sent. Medications administered as ordered, see MAR. No acute distress noted. Safety maintained throughout.

## 2024-10-27 NOTE — H&P ADULT - ASSESSMENT
74F PMH GIST, Osteoporosis, HTN, chest pain and afib who presents with partial likely adhesive small bowel obstruction.     Plan:  - Admit to Dr. Bryanna Rivas  - NPO/NGT/decompression  - Consider GG trial after decompression  - AROBF  - DVT ppx    B team surgery [id number]

## 2024-10-27 NOTE — ED ADULT NURSE NOTE - NSFALLUNIVINTERV_ED_ALL_ED
Bed/Stretcher in lowest position, wheels locked, appropriate side rails in place/Call bell, personal items and telephone in reach/Instruct patient to call for assistance before getting out of bed/chair/stretcher/Non-slip footwear applied when patient is off stretcher/[place] to call system/Physically safe environment - no spills, clutter or unnecessary equipment/Purposeful proactive rounding/Room/bathroom lighting operational, light cord in reach

## 2024-10-27 NOTE — H&P ADULT - NSHPLABSRESULTS_GEN_ALL_CORE
13.8   9.18  )-----------( 121      ( 27 Oct 2024 01:30 )             40.7   10-27    140  |  100  |  22  ----------------------------<  135[H]  3.3[L]   |  26  |  0.68    Ca    9.6      27 Oct 2024 01:30    TPro  7.0  /  Alb  4.2  /  TBili  0.9  /  DBili  x   /  AST  23  /  ALT  17  /  AlkPhos  62  10-27    INTERPRETATION:  CLINICAL INFORMATION: Epigastric/mid abdominal pain.   Remote history of gastric tumor resection.    COMPARISON:CT abdomen pelvis 6/5/2017.    CONTRAST/COMPLICATIONS:  IV Contrast: Omnipaque 350  90 cc administered   10 cc discarded  Oral Contrast: NONE  Complications: None reported at time of study completion    PROCEDURE:  CT of the Abdomen and Pelvis was performed.  Sagittal and coronal reformats were performed.    FINDINGS:  LOWER CHEST: Within normal limits.    LIVER: Ovoid hypodense lesion measuring 3.5 cm in segment 7/8, stable in   size. Additional 2.2 cm hypodense focus in segment 8/4, indeterminant.   Additional subcentimeter hypodensities too small to characterize.  BILE DUCTS: Normal caliber.  GALLBLADDER: Cholelithiasis.  SPLEEN: Within normal limits.  PANCREAS: Within normal limits.  ADRENALS: Within normal limits.  KIDNEYS/URETERS: No renal stones or hydronephrosis. Nonobstructing 5 mm   right renal calculus. Nonobstructing 4 mm left renal calculus Bilateral   renal cysts    BLADDER: Within normal limits.  REPRODUCTIVE ORGANS: Hysterectomy.    BOWEL: Dilated small bowel with transition in the mid abdomen distal to   fecalized bowel (2:87). Distal small bowel is collapsed. Appendix is   normal. Sigmoid diverticulosis.  Status post sleeve gastrectomy.  PERITONEUM/RETROPERITONEUM: Surgical clips in the gastrohepatic space..   Trace interloop fluid. No free air or fluid collection.  VESSELS: Atherosclerotic changes.  LYMPH NODES: No lymphadenopathy.  ABDOMINAL WALL: Left ventral wall hernia containing unobstructed loop of   large bowel. Small fat-containing right inguinal hernia.  BONES: Degenerative changes.    IMPRESSION:  Low-grade small bowel obstruction with single point of transition in the   mid abdomen.  Indeterminant hepatic foci which can be further evaluated with MRI,   particularly given the history of gastric tumor.      Findings were discussed with Dr. GABRIELLA Burt 10/27/2024 4:23 AM by

## 2024-10-27 NOTE — ED ADULT NURSE REASSESSMENT NOTE - NS ED NURSE REASSESS COMMENT FT1
Patient AOx4, c/o N/V and abdominal pain; MD Choi made aware. Patient medicated per chart. Patient admitted to surgery pending transport to [facility]. Family at bedside. NGT connected to suction; right nare. Comfort measures maintained. Bed in lowest position. Safety maintained.

## 2024-10-27 NOTE — ED PROVIDER NOTE - OBJECTIVE STATEMENT
75 y/o F woman with PMH of GIST, osteopetrosis, HTN, hernia surgery p/w abd pain. 75 y/o F woman with PMH of GIST, osteopetrosis, HTN, hernia surgery p/w abd pain. Patient states acute abdominal pain starting earlier today with 2 episodes of nonbloody nonbilious vomiting.  Endorsing abdominal pain to epigastric and mid abdominal region.  States she is not passing gas today.  States small bowel movement earlier in the morning.  Denies any fevers, chills, chest pain, shortness of breath, urinary complaint

## 2024-10-27 NOTE — ED PROVIDER NOTE - PHYSICAL EXAMINATION
Esvin Burt DO (PGY3)   Physical Exam:    Gen: NAD, AOx3  Head: NCAT  HEENT: EOMI, PEERLA  Lung: CTAB, no respiratory distress, no wheezes/rhonchi/rales B/L  CV: RRR, no murmurs, rubs or gallops  Abd: soft, epigastric and periumbilical pain, ND, no guarding, no rigidity, no rebound tenderness, no CVA tenderness   MSK: no visible deformities, ROM normal in UE/LE, no back pain  Neuro: No focal sensory or motor deficits. Sensation intact to light touch all extremities.  Skin: Warm, well perfused, no rash, no leg swelling

## 2024-10-27 NOTE — ED ADULT NURSE NOTE - AS PAIN REST
HPI Comments: 2:41 PM  I have evaluated the patient as the Provider in Triage. I have reviewed His vital signs and the triage nurse assessment. I have talked with the patient and any available family and advised that I am the provider in triage and have ordered the appropriate study to initiate their work up based on the clinical presentation during my assessment. I have advised that the patient will be accommodated in the Main ED as soon as possible. I have also requested to contact the triage nurse or myself immediately if the patient experiences any changes in their condition during this brief waiting period. 67 yo male with h/o recent knee replacement, UTI presents with subjective fever, chills, malaise, increased frequency with small amt of urine. Afebrile in triage, rectal temp ordered. Labs urine, IVF. Savanah Mack. Domingo Martinez MD    3:19pm    Patient is a 66year old male with a past medical history significant for HTN, GERD, Asthma, CAD, Kidney Stones, UTI, BPH, and a past surgical history of Heart Catheterization, Tonsillectomy, Lithotripsy, Vascular Surgery who presents ambulatory to the ED with a chief complaint of chills,  Increased urinary frequency, urgency onset today. Pt reports that his sx started this this morning and has since continued to worsen. Of note, pt states that he recently had a rt knee replacement. Pt additionally c/o nausea but specifically denies cough, hematuria, dysuria, v/d or any other acute sx. Pt denies any known sick contacts/recent illness. Pt last took Tylenol at 13:30 today. There are no additional medical complaints at this time. Social hx: Pt is a former smoker, denies EtOH/drug use. Signed by: Jalen Mahajan, scribing for Parth Quinones NP on August 16th, 2018 at 15:05pm.      The history is provided by the patient. No  was used.         Past Medical History:   Diagnosis Date    Arthritis     Asthma     R/T ENVIRONMENTAL ALLERGIES; INHALER USE DAILY    CAD (coronary artery disease) 2007    MI    Edema extremities     LEGS; WEARS COMPRESSION STOCKINGS    GERD (gastroesophageal reflux disease)     History of BPH     History of kidney stones     Hx: UTI (urinary tract infection)     Hypertension     Sleep apnea     CPAP       Past Surgical History:   Procedure Laterality Date    BREAST SURGERY PROCEDURE UNLISTED Right     EXCISION OF BREAST LUMP (BENIGN)    HX HEART CATHETERIZATION  2007, 2016    STENTS X3  (INGRID--DR NUNEZ)    HX LITHOTRIPSY      2-3X    HX ORTHOPAEDIC Right 1964    FEMUR ORIF  (DR PRESCOTT)    HX TONSILLECTOMY      HX UROLOGICAL      CYSTO    VASCULAR SURGERY PROCEDURE UNLIST Right     LEG VEIN BYPASS (INGRID)         Family History:   Problem Relation Age of Onset    Lung Disease Mother     Asthma Mother     Cancer Father      LUNG    Cancer Brother      BRAIN    Heart Disease Brother     Heart Disease Brother     Heart Disease Brother     Anesth Problems Neg Hx        Social History     Social History    Marital status:      Spouse name: N/A    Number of children: N/A    Years of education: N/A     Occupational History    Not on file. Social History Main Topics    Smoking status: Former Smoker     Packs/day: 2.00     Years: 20.00     Quit date: 1985    Smokeless tobacco: Never Used    Alcohol use No    Drug use: No    Sexual activity: Not on file     Other Topics Concern    Not on file     Social History Narrative         ALLERGIES: Sulfa (sulfonamide antibiotics)    Review of Systems   Constitutional: Negative for activity change, appetite change, chills, fatigue and fever. HENT: Negative for congestion, ear discharge, ear pain, sinus pain, sinus pressure, sore throat and trouble swallowing. Eyes: Negative for photophobia, pain, redness, itching and visual disturbance. Respiratory: Negative for chest tightness and shortness of breath.     Cardiovascular: Negative for chest pain, palpitations and leg swelling. Gastrointestinal: Positive for nausea. Negative for abdominal distention, abdominal pain and vomiting. Endocrine: Negative. Genitourinary: Positive for frequency and urgency. Negative for difficulty urinating. Musculoskeletal: Negative for back pain, neck pain and neck stiffness. Skin: Negative for color change, pallor, rash and wound. Allergic/Immunologic: Negative. Neurological: Negative for dizziness, syncope, weakness and headaches. Hematological: Does not bruise/bleed easily. Psychiatric/Behavioral: Negative for behavioral problems. The patient is not nervous/anxious. All other systems reviewed and are negative. There were no vitals filed for this visit. Physical Exam   Constitutional: He is oriented to person, place, and time. He appears well-developed and well-nourished. No distress. HENT:   Head: Normocephalic and atraumatic. Right Ear: External ear normal.   Left Ear: External ear normal.   Nose: Nose normal.   Mouth/Throat: Oropharynx is clear and moist.   Eyes: Conjunctivae and EOM are normal. Pupils are equal, round, and reactive to light. Right eye exhibits no discharge. Left eye exhibits no discharge. Neck: Normal range of motion. Neck supple. No JVD present. No tracheal deviation present. Cardiovascular: Normal rate, regular rhythm, normal heart sounds and intact distal pulses. Exam reveals no gallop and no friction rub. No murmur heard. Pulmonary/Chest: Effort normal and breath sounds normal. No respiratory distress. He has no wheezes. He has no rales. He exhibits no tenderness. Abdominal: Soft. Bowel sounds are normal. He exhibits no distension. There is no tenderness. There is no rebound and no guarding. Genitourinary:   Genitourinary Comments: Urinary urgency and frequency   Musculoskeletal: Normal range of motion. He exhibits no edema or tenderness.    Neurological: He is alert and oriented to person, place, and time. No cranial nerve deficit. Motor; symmetric   Skin: Skin is warm and dry. No rash noted. No erythema. No pallor. Hot to touch   Psychiatric: He has a normal mood and affect. His behavior is normal. Judgment and thought content normal.   Nursing note and vitals reviewed. MDM  Number of Diagnoses or Management Options        ED Course     1907: Noted temperature. Tylenol ordered. Awaiting urine sample. 4:25 PM  Change of shift. Care of patient signed over to [facility]. Bedside handoff complete.     Procedures 8 (severe pain)

## 2024-10-27 NOTE — ED ADULT TRIAGE NOTE - BIRTH SEX
RT Protocol Note  Chantelle Burgos 85 y.o. female MRN: [id number]  Unit/Bed#: S -01 Encounter: [id number]    Assessment    Principal Problem:    TIA (transient ischemic attack)  Active Problems:    Aortic valve disorder    Essential hypertension    Hyperlipidemia    Pulmonary hypertension (HCC)      Home Pulmonary Medications:  N/A     Subjective       Objective    Physical Exam:   Assessment Type: Assess only  General Appearance: Alert, Awake  Respiratory Pattern: Normal  Chest Assessment: Chest expansion symmetrical  Bilateral Breath Sounds: Clear  Cough: None    Vitals:  Blood pressure (!) 155/128, pulse (!) 128, temperature 98.2 °F (36.8 °C), resp. rate 18, SpO2 96%.          Imaging and other studies: I have personally reviewed pertinent reports.            Plan    Respiratory Plan: No distress/Pulmonary history, Discontinue Protocol        Resp Comments: Pt is in no distress, discontinue protocol. Pt states no history of pulmonary or respiratory diagnoses. No use of any respiratory medications or oxygen a home.    Female

## 2024-10-27 NOTE — ED ADULT NURSE REASSESSMENT NOTE - NS ED NURSE REASSESS COMMENT FT1
Patient is AOx4 and in no signs of acute distress. Patient pending CT scan results and urine collection. Respirations even and unlabored, chest rise symmetrical b/l. Family at bedside. Comfort measures maintained. Bed in lowest position. Safety maintained.

## 2024-10-28 ENCOUNTER — TRANSCRIPTION ENCOUNTER (OUTPATIENT)
Age: 74
End: 2024-10-28

## 2024-10-28 VITALS
SYSTOLIC BLOOD PRESSURE: 143 MMHG | TEMPERATURE: 98 F | OXYGEN SATURATION: 98 % | RESPIRATION RATE: 18 BRPM | HEART RATE: 68 BPM | DIASTOLIC BLOOD PRESSURE: 80 MMHG

## 2024-10-28 LAB
ANION GAP SERPL CALC-SCNC: 10 MMOL/L — SIGNIFICANT CHANGE UP (ref 7–14)
BUN SERPL-MCNC: 17 MG/DL — SIGNIFICANT CHANGE UP (ref 7–23)
CALCIUM SERPL-MCNC: 8.4 MG/DL — SIGNIFICANT CHANGE UP (ref 8.4–10.5)
CHLORIDE SERPL-SCNC: 106 MMOL/L — SIGNIFICANT CHANGE UP (ref 98–107)
CO2 SERPL-SCNC: 27 MMOL/L — SIGNIFICANT CHANGE UP (ref 22–31)
CREAT SERPL-MCNC: 0.6 MG/DL — SIGNIFICANT CHANGE UP (ref 0.5–1.3)
CULTURE RESULTS: SIGNIFICANT CHANGE UP
EGFR: 93 ML/MIN/1.73M2 — SIGNIFICANT CHANGE UP
GLUCOSE SERPL-MCNC: 91 MG/DL — SIGNIFICANT CHANGE UP (ref 70–99)
HCT VFR BLD CALC: 37.7 % — SIGNIFICANT CHANGE UP (ref 34.5–45)
HGB BLD-MCNC: 12.4 G/DL — SIGNIFICANT CHANGE UP (ref 11.5–15.5)
MAGNESIUM SERPL-MCNC: 2.2 MG/DL — SIGNIFICANT CHANGE UP (ref 1.6–2.6)
MCHC RBC-ENTMCNC: 29.8 PG — SIGNIFICANT CHANGE UP (ref 27–34)
MCHC RBC-ENTMCNC: 32.9 GM/DL — SIGNIFICANT CHANGE UP (ref 32–36)
MCV RBC AUTO: 90.6 FL — SIGNIFICANT CHANGE UP (ref 80–100)
NRBC # BLD: 0 /100 WBCS — SIGNIFICANT CHANGE UP (ref 0–0)
NRBC # FLD: 0 K/UL — SIGNIFICANT CHANGE UP (ref 0–0)
PHOSPHATE SERPL-MCNC: 1.7 MG/DL — LOW (ref 2.5–4.5)
PLATELET # BLD AUTO: 115 K/UL — LOW (ref 150–400)
POTASSIUM SERPL-MCNC: 3.2 MMOL/L — LOW (ref 3.5–5.3)
POTASSIUM SERPL-SCNC: 3.2 MMOL/L — LOW (ref 3.5–5.3)
RBC # BLD: 4.16 M/UL — SIGNIFICANT CHANGE UP (ref 3.8–5.2)
RBC # FLD: 13.8 % — SIGNIFICANT CHANGE UP (ref 10.3–14.5)
SODIUM SERPL-SCNC: 143 MMOL/L — SIGNIFICANT CHANGE UP (ref 135–145)
SPECIMEN SOURCE: SIGNIFICANT CHANGE UP
WBC # BLD: 5.43 K/UL — SIGNIFICANT CHANGE UP (ref 3.8–10.5)
WBC # FLD AUTO: 5.43 K/UL — SIGNIFICANT CHANGE UP (ref 3.8–10.5)

## 2024-10-28 PROCEDURE — 99231 SBSQ HOSP IP/OBS SF/LOW 25: CPT

## 2024-10-28 RX ORDER — CETIRIZINE HCL 10 MG/1
10 TABLET ORAL ONCE
Refills: 0 | Status: COMPLETED | OUTPATIENT
Start: 2024-10-28 | End: 2024-10-28

## 2024-10-28 RX ORDER — POTASSIUM CHLORIDE 10 MEQ
40 TABLET, EXTENDED RELEASE ORAL ONCE
Refills: 0 | Status: COMPLETED | OUTPATIENT
Start: 2024-10-28 | End: 2024-10-28

## 2024-10-28 RX ORDER — DIBASIC SODIUM PHOSPHATE, MONOBASIC POTASSIUM PHOSPHATE AND MONOBASIC SODIUM PHOSPHATE 852; 155; 130 MG/1; MG/1; MG/1
2 TABLET ORAL ONCE
Refills: 0 | Status: COMPLETED | OUTPATIENT
Start: 2024-10-28 | End: 2024-10-28

## 2024-10-28 RX ADMIN — Medication 40 MILLIEQUIVALENT(S): at 17:49

## 2024-10-28 RX ADMIN — CETIRIZINE HCL 10 MILLIGRAM(S): 10 TABLET ORAL at 14:31

## 2024-10-28 RX ADMIN — Medication 40 MILLIGRAM(S): at 17:50

## 2024-10-28 RX ADMIN — Medication 1000 MILLIGRAM(S): at 06:30

## 2024-10-28 RX ADMIN — Medication 400 MILLIGRAM(S): at 06:15

## 2024-10-28 RX ADMIN — DIBASIC SODIUM PHOSPHATE, MONOBASIC POTASSIUM PHOSPHATE AND MONOBASIC SODIUM PHOSPHATE 2 TABLET(S): 852; 155; 130 TABLET ORAL at 17:50

## 2024-10-28 NOTE — DISCHARGE NOTE NURSING/CASE MANAGEMENT/SOCIAL WORK - FINANCIAL ASSISTANCE
[facility] provides services at a reduced cost to those who are determined to be eligible through [facility]’s financial assistance program. Information regarding [facility]’s financial assistance program can be found by going to [email or web address] or by calling [phone].

## 2024-10-28 NOTE — DISCHARGE NOTE PROVIDER - HOSPITAL COURSE
74F PMH GIST, Osteoporosis, HTN, chest pain and afib who presents with 2 days of abd pain N/V with decreased passing of gas and bowel function. Pt last passed gas 2 days ago, last had BM yesterday. CT scan in the ED showed: Low-grade small bowel obstruction with single point of transition in the mid abdomen.  Indeterminant hepatic foci which can be further evaluated with MRI, particularly given the history of gastric tumor.  Patient made NPO with IVF. NGT placed in ED.  Patient then underwent Gastrogaffin challenge and contrast noted seen in the colon 4 hours later on abdominal xray. NGT removed and patient was started on a clear liquid diet. Began passing gas and stool. Diet advanced to regular.    At the time of discharge, the patient was hemodynamically stable, was tolerating PO diet, was voiding urine and passing stool.  Patient was ambulating and was comfortable with adequate pain control.  The patient was instructed to follow up with Dr. Valdez as needed after discharge from the hospital.  The patient / family felt comfortable with discharge.  The patient had no other issues.    Per attending, patient deemed medically stable and ready for discharge at this time.

## 2024-10-28 NOTE — DISCHARGE NOTE PROVIDER - NSDCFUADDAPPT_GEN_ALL_CORE_FT
Incidental finding: During your hospital stay you had a CT scan which showed an incidental finding of:  Indeterminant hepatic foci which can be further evaluated with MRI  Please follow up this finding with your primary care physician and undergo outpatient MRI. Incidental finding: During your hospital stay you had a CT scan which showed an incidental finding of:  Indeterminant hepatic foci which can be further evaluated with MRI  Please follow up this finding with your primary care physician and undergo outpatient MRI. Please let your primary care physician know

## 2024-10-28 NOTE — DISCHARGE NOTE PROVIDER - NSDCACTIVITY_GEN_ALL_CORE
Return to Work/School allowed/Follow Instructions Provided by your Surgical Team No restrictions/Return to Work/School allowed/Follow Instructions Provided by your Surgical Team

## 2024-10-28 NOTE — PROGRESS NOTE ADULT - SUBJECTIVE AND OBJECTIVE BOX
INTERVAL EVENTS: NGT removed  SUBJECTIVE: Patient seen and examined at bedside with surgical team, patient without complaints. Denies fever, chills, CP, SOB nausea, vomiting, abdominal pain.    OBJECTIVE:    Vital Signs Last 24 Hrs  T(C): 36.7 (28 Oct 2024 14:58), Max: 36.8 (27 Oct 2024 22:00)  T(F): 98 (28 Oct 2024 14:58), Max: 98.2 (27 Oct 2024 22:00)  HR: 68 (28 Oct 2024 14:58) (58 - 68)  BP: 143/80 (28 Oct 2024 14:58) (111/75 - 152/83)  BP(mean): --  RR: 18 (28 Oct 2024 14:58) (17 - 18)  SpO2: 98% (28 Oct 2024 14:58) (95% - 98%)    Parameters below as of 28 Oct 2024 14:58  Patient On (Oxygen Delivery Method): room air    I&O's Detail    27 Oct 2024 07:01  -  28 Oct 2024 07:00  --------------------------------------------------------  IN:    Lactated Ringers: 2400 mL  Total IN: 2400 mL    OUT:    Nasogastric/Oral tube (mL): 110 mL    Oral Fluid: 0 mL  Total OUT: 110 mL    Total NET: 2290 mL      28 Oct 2024 07:01  -  28 Oct 2024 17:15  --------------------------------------------------------  IN:    Lactated Ringers: 600 mL    Oral Fluid: 500 mL  Total IN: 1100 mL    OUT:    Voided (mL): 300 mL  Total OUT: 300 mL    Total NET: 800 mL      MEDICATIONS  (STANDING):  diatrizoate meglumine/diatrizoate sodium. 100 milliLiter(s) Oral once  enoxaparin Injectable 40 milliGRAM(s) SubCutaneous every 24 hours  lactated ringers. 1000 milliLiter(s) (100 mL/Hr) IV Continuous <Continuous>  potassium chloride    Tablet ER 40 milliEquivalent(s) Oral once  potassium phosphate / sodium phosphate Tablet (K-PHOS No. 2) 2 Tablet(s) Oral once    MEDICATIONS  (PRN):  ondansetron Injectable 4 milliGRAM(s) IV Push every 6 hours PRN Nausea and/or Vomiting      PHYSICAL EXAM:  Constitutional: A&Ox3, NAD  Respiratory: Unlabored breathing  Abdomen: Soft, nondistended, NTTP. No rebound or guarding.  Extremities: WWP, ESPINO spontaneously    LABS:                        12.4   5.43  )-----------( 115      ( 28 Oct 2024 06:05 )             37.7     10-28    143  |  106  |  17  ----------------------------<  91  3.2[L]   |  27  |  0.60    Ca    8.4      28 Oct 2024 06:05  Phos  1.7     10-28  Mg     2.20     10-28    TPro  7.0  /  Alb  4.2  /  TBili  0.9  /  DBili  x   /  AST  23  /  ALT  17  /  AlkPhos  62  10-27      LIVER FUNCTIONS - ( 27 Oct 2024 01:30 )  Alb: 4.2 g/dL / Pro: 7.0 g/dL / ALK PHOS: 62 U/L / ALT: 17 U/L / AST: 23 U/L / GGT: x           Urinalysis Basic - ( 28 Oct 2024 06:05 )    Color: x / Appearance: x / SG: x / pH: x  Gluc: 91 mg/dL / Ketone: x  / Bili: x / Urobili: x   Blood: x / Protein: x / Nitrite: x   Leuk Esterase: x / RBC: x / WBC x   Sq Epi: x / Non Sq Epi: x / Bacteria: x          
Subjective: Patient is a 74y old  Female who called me presented last night with partial sbo after having 2 days nausea vomiting has hx gist surgery  most likely symptoms  due to adhesions responded to ng decompression and ng pulled after gastrograffin challenge showed resolution of obstruction   PAST MEDICAL & SURGICAL HISTORY:  High blood pressure      Gastrointestinal stromal tumor (GIST)  first resection 20 years prior, last 4 years prior      No other significant past surgical history          Allergies    No Known Allergies    Intolerances  fh unknown       MEDICATIONS  (STANDING):  diatrizoate meglumine/diatrizoate sodium. 100 milliLiter(s) Oral once  enoxaparin Injectable 40 milliGRAM(s) SubCutaneous every 24 hours  lactated ringers. 1000 milliLiter(s) (100 mL/Hr) IV Continuous <Continuous>    MEDICATIONS  (PRN):  ondansetron Injectable 4 milliGRAM(s) IV Push every 6 hours PRN Nausea and/or Vomiting      CONSTITUTIONAL: No fever, weight loss, or fatigue  EYES: No eye pain, visual disturbances, or discharge  ENMT:  No difficulty hearing, tinnitus, vertigo; No sinus or throat pain  NECK: No pain or stiffness  BREASTS: No pain, masses, or nipple discharge  RESPIRATORY: No cough, wheezing, chills or hemoptysis; No shortness of breath  CARDIOVASCULAR: No chest pain, palpitations, dizziness, or leg swelling  GASTROINTESTINAL: No abdominal or epigastric pain. No nausea, vomiting, or hematemesis; No diarrhea or constipation. No melena or hematochezia.  GENITOURINARY: No dysuria, frequency, hematuria, or incontinence  NEUROLOGICAL: No headaches, memory loss, loss of strength, numbness, or tremors  SKIN: No itching, burning, rashes, or lesions   LYMPH NODES: No enlarged glands  ENDOCRINE: No heat or cold intolerance; No hair loss  MUSCULOSKELETAL: No joint pain or swelling; No muscle, back, or extremity pain  PSYCHIATRIC: No depression, anxiety, mood swings, or difficulty sleeping  HEME/LYMPH: No easy bruising, or bleeding gums  ALLERY AND IMMUNOLOGIC: No hives or eczema      PHYSICAL EXAM:    GENERAL: Comfortable, no acute distress  bp 142/80 rr 16 puse 84 pulse ox98   HEAD:  Normocephalic, atraumatic  EYES: EOMI, PERRLA  HEENT: Moist mucous membranes  NECK: Supple, No JVD  NERVOUS SYSTEM:  Alert & Oriented X3, Motor Strength 5/5 B/L upper and lower extremities  CHEST/LUNG: Clear to auscultation bilaterally  HEART: Regular rate and rhythm  ABDOMEN: Soft, Nontender, Nondistended, Bowel sounds present  GENITOURINARY: Voiding, no palpable bladder  EXTREMITIES:   No clubbing, cyanosis, or edema  MUSCULOSKELTAL- No muscle tenderness, no joint tenderness  SKIN-no rash  < from: CT Abdomen and Pelvis w/ IV Cont (10.27.24 @ 03:39) >    ACC: [id number] EXAM:  CT ABDOMEN AND PELVIS IC   ORDERED BY: RIAN MORTON     PROCEDURE DATE:  10/27/2024          INTERPRETATION:  CLINICAL INFORMATION: Epigastric/mid abdominal pain.   Remote history of gastric tumor resection.    COMPARISON:CT abdomen pelvis 6/5/2017.    CONTRAST/COMPLICATIONS:  IV Contrast: Omnipaque 350  90 cc administered   10 cc discarded  Oral Contrast: NONE  Complications: None reported at time of study completion    PROCEDURE:  CT of the Abdomen and Pelvis was performed.  Sagittal and coronal reformats were performed.    FINDINGS:  LOWER CHEST: Within normal limits.    LIVER: Ovoid hypodense lesion measuring 3.5 cm in segment 7/8, stable in   size. Additional 2.2 cm hypodense focus in segment 8/4, indeterminant.   Additional subcentimeter hypodensities too small to characterize.  BILE DUCTS: Normal caliber.  GALLBLADDER: Cholelithiasis.  SPLEEN: Within normal limits.  PANCREAS: Within normal limits.  ADRENALS: Within normal limits.  KIDNEYS/URETERS: No renal stones or hydronephrosis. Nonobstructing 5 mm   right renal calculus. Nonobstructing 4 mm left renal calculus Bilateral   renal cysts    BLADDER: Within normal limits.  REPRODUCTIVE ORGANS: Hysterectomy.    BOWEL: Dilated small bowel with transition in the mid abdomen distal to   fecalized bowel (2:87). Distal small bowel is collapsed. Appendix is   normal. Sigmoid diverticulosis.  Status post sleeve gastrectomy.  PERITONEUM/RETROPERITONEUM: Surgical clips in the gastrohepatic space..   Trace interloop fluid. No free air or fluid collection.  VESSELS: Atherosclerotic changes.  LYMPH NODES: No lymphadenopathy.  ABDOMINAL WALL: Left ventral wall hernia containing unobstructed loop of   large bowel. Small fat-containing right inguinal hernia.  BONES: Degenerative changes.    IMPRESSION:  Low-grade small bowel obstruction with single point of transition in the   mid abdomen.  Indeterminant hepatic foci which can be further evaluated with MRI,   particularly given the history of gastric tumor.    < end of copied text >      < from: Xray Abdomen 1 View PORTABLE -Urgent (Xray Abdomen 1 View PORTABLE -Urgent .) (10.27.24 @ 18:10) >    ACC: [id number] EXAM:  XR ABDOMEN PORTABLE URGENT 1V   ORDERED BY: MEHREEN GOLD     PROCEDURE DATE:  10/27/2024          INTERPRETATION:  EXAMINATION: XR ABDOMEN URGENT    CLINICAL INDICATION: Gastrografin challenge. Evaluate transit of contrast.    TECHNIQUE: Single frontal, portable view of the abdomen was obtained.    COMPARISON: CT chest abdomen pelvis 10/27/2024    FINDINGS:    Enteric tube courses below level diaphragm with tip in the stomach.  No radiographic evidence of intraperitoneal free air.  Multiple dilated small and large bowel loops filled with contrast.  No acute osseous abnormality.    IMPRESSION:    Multiple dilated small and large bowel loops filled with contrast..    < end of copied text >                LABS:    10-28    143  |  106  |  17  ----------------------------<  91  3.2[L]   |  27  |  0.60    Ca    8.4      28 Oct 2024 06:05  Phos  1.7     10-28  Mg     2.20     10-28    TPro  7.0  /  Alb  4.2  /  TBili  0.9  /  DBili  x   /  AST  23  /  ALT  17  /  AlkPhos  62  10-27    CBC Full  -  ( 28 Oct 2024 06:05 )  WBC Count : 5.43 K/uL  RBC Count : 4.16 M/uL  Hemoglobin : 12.4 g/dL  Hematocrit : 37.7 %  Platelet Count - Automated : 115 K/uL  Mean Cell Volume : 90.6 fL  Mean Cell Hemoglobin : 29.8 pg  Mean Cell Hemoglobin Concentration : 32.9 gm/dL  Auto Neutrophil # : x  Auto Lymphocyte # : x  Auto Monocyte # : x  Auto Eosinophil # : x  Auto Basophil # : x  Auto Neutrophil % : x  Auto Lymphocyte % : x  Auto Monocyte % : x  Auto Eosinophil % : x  Auto Basophil % : x    Urinalysis Basic - ( 28 Oct 2024 06:05 )    Color: x / Appearance: x / SG: x / pH: x  Gluc: 91 mg/dL / Ketone: x  / Bili: x / Urobili: x   Blood: x / Protein: x / Nitrite: x   Leuk Esterase: x / RBC: x / WBC x   Sq Epi: x / Non Sq Epi: x / Bacteria: x      LIVER FUNCTIONS - ( 27 Oct 2024 01:30 )  Alb: 4.2 g/dL / Pro: 7.0 g/dL / ALK PHOS: 62 U/L / ALT: 17 U/L / AST: 23 U/L / GGT: x                 EKG:    Imaging Studies:    Impression / Plan:

## 2024-10-28 NOTE — DISCHARGE NOTE NURSING/CASE MANAGEMENT/SOCIAL WORK - PATIENT PORTAL LINK FT
You can access the FollowMyHealth Patient Portal offered by [facility] by registering at the following website: [email or web address]. By joining [facility]’s FollowMyHealth portal, you will also be able to view your health information using other applications (apps) compatible with our system.

## 2024-10-28 NOTE — DISCHARGE NOTE PROVIDER - NSDCCPCAREPLAN_GEN_ALL_CORE_FT
PRINCIPAL DISCHARGE DIAGNOSIS  Diagnosis: Small bowel obstruction  Assessment and Plan of Treatment: Incidental finding:  During your hospital stay you had a CT scan which showed an incidental finding of:   Indeterminant hepatic foci which can be further evaluated with MRI  Please follow up this finding with your primary care physician and undergo outpatient MRI.  DIET: Return to your usual diet.  NOTIFY YOUR SURGEON IF YOU HAVE: any bleeding that does not stop, any pus draining from your wound(s), any fever (over 100.4 F) persistent nausea/vomiting, or if your pain is not controlled on your discharge pain medications, unable to urinate.  FOLLOW UP:  1. Please follow up with your primary care physician in one week regarding your hospitalization, bring copies of your discharge paperwork.  2. Please follow up with your surgeon, Dr. Valdez as needed after discharge from the hospital     PRINCIPAL DISCHARGE DIAGNOSIS  Diagnosis: Small bowel obstruction  Assessment and Plan of Treatment: Incidental finding:  During your hospital stay you had a CT scan which showed an incidental finding of:   Indeterminant hepatic foci which can be further evaluated with MRI  Please follow up this finding with your primary care physician and undergo outpatient MRI.  DIET: Return to your usual diet.  NOTIFY YOUR SURGEON IF YOU HAVE: any bleeding that does not stop, any pus draining from your wound(s), any fever (over 100.4 F) persistent nausea/vomiting, or if your pain is not controlled on your discharge pain medications, unable to urinate.  FOLLOW UP:  1. Please follow up with your primary care physician in one week regarding your hospitalization, bring copies of your discharge paperwork.  2. Please follow up with your surgeon, Dr. Valdez as needed after discharge from the hospital  3. Please f/u with your primary care physican regarding an MRI to assess the liver foci

## 2024-10-28 NOTE — PROGRESS NOTE ADULT - ASSESSMENT
bp well controlled  ct showed partial sbo   after ng tube decompression showed passage of gastrograffin to colon   ng pulled to advance diet and discharghe as tolerated   bp well controlled 
74F PMH GIST, Osteoporosis, HTN, chest pain and afib who presents with partial likely adhesive small bowel obstruction.     Plan:  - Discharge  - NGT removed  - regular diet  - DVT ppx    B team surgery [id number]

## 2024-10-28 NOTE — DISCHARGE NOTE PROVIDER - CARE PROVIDER_API CALL
Sancho Valdez  Surgery  [address] [place] [id number]  Phone: [phone]  Fax: [phone]  Follow Up Time: Routine

## 2024-10-28 NOTE — DISCHARGE NOTE PROVIDER - NSDCMRMEDTOKEN_GEN_ALL_CORE_FT
apixaban 5 mg oral tablet: 1 tab(s) orally every 12 hours  losartan 100 mg oral tablet: 1 tab(s) orally once a day

## 2024-10-30 ENCOUNTER — TRANSCRIPTION ENCOUNTER (OUTPATIENT)
Age: 74
End: 2024-10-30

## 2025-01-27 ENCOUNTER — APPOINTMENT (OUTPATIENT)
Dept: CARDIOLOGY | Facility: CLINIC | Age: 75
End: 2025-01-27

## 2025-01-31 ENCOUNTER — APPOINTMENT (OUTPATIENT)
Dept: CARDIOLOGY | Facility: CLINIC | Age: 75
End: 2025-01-31

## 2025-02-03 ENCOUNTER — APPOINTMENT (OUTPATIENT)
Dept: CARDIOLOGY | Facility: CLINIC | Age: 75
End: 2025-02-03
Payer: MEDICARE

## 2025-02-03 ENCOUNTER — NON-APPOINTMENT (OUTPATIENT)
Age: 75
End: 2025-02-03

## 2025-02-03 VITALS
HEIGHT: 64 IN | OXYGEN SATURATION: 97 % | WEIGHT: 155 LBS | HEART RATE: 55 BPM | SYSTOLIC BLOOD PRESSURE: 122 MMHG | DIASTOLIC BLOOD PRESSURE: 72 MMHG | BODY MASS INDEX: 26.46 KG/M2

## 2025-02-03 DIAGNOSIS — D21.4 BENIGN NEOPLASM OF CONNECTIVE AND OTHER SOFT TISSUE OF ABDOMEN: ICD-10-CM

## 2025-02-03 DIAGNOSIS — R03.0 ELEVATED BLOOD-PRESSURE READING, W/OUT DIAGNOSIS OF HYPERTENSION: ICD-10-CM

## 2025-02-03 DIAGNOSIS — G47.33 OBSTRUCTIVE SLEEP APNEA (ADULT) (PEDIATRIC): ICD-10-CM

## 2025-02-03 DIAGNOSIS — E78.00 PURE HYPERCHOLESTEROLEMIA, UNSPECIFIED: ICD-10-CM

## 2025-02-03 DIAGNOSIS — E78.5 HYPERLIPIDEMIA, UNSPECIFIED: ICD-10-CM

## 2025-02-03 DIAGNOSIS — I48.0 PAROXYSMAL ATRIAL FIBRILLATION: ICD-10-CM

## 2025-02-03 DIAGNOSIS — K21.9 GASTRO-ESOPHAGEAL REFLUX DISEASE W/OUT ESOPHAGITIS: ICD-10-CM

## 2025-02-03 PROCEDURE — 99214 OFFICE O/P EST MOD 30 MIN: CPT

## 2025-02-03 PROCEDURE — G2211 COMPLEX E/M VISIT ADD ON: CPT

## 2025-03-07 NOTE — DISCHARGE NOTE NURSING/CASE MANAGEMENT/SOCIAL WORK - NSTRANSFERBELONGINGSDISPO_GEN_A_NUR
Post-Operative Instructions  Dr. Parviz Melissa [phone]    Dressing:  You have a bandage or splint covering your operative site.    You may remove the dressing in 4  days following surgery. Once your dressing is removed you may shower and/or wash your incision in a sink with soap and water. Do not soak your incision. No bath tubs, hot tubs or swimming pools. After washing the wound please pat the incision dry thoroughly. Please use mild soap. Please do not use hydrogen peroxide. Please cover the wound with a band-aid or gauze and change it daily. Please do not apply any salves, creams, lotions or ointments to the surgical incision. Otherwise keep incision clean and dry (no yard work, engine work, etc.)    Post Anesthesia Instructions:  If you received general anesthesia or IV sedation for your procedure for the next 24 hours: No driving, no drinking alcohol, no sleeping aids, no important decision making, and have an adult with you for 24 hours.    Showering:  You may shower following surgery but please adhere to above instructions regarding the dressing. If showering with bandage/splint in place please ensure that it is kept dry and covered while bathing. There are commercially available cast bags that can be used to protect your dressing while showering. If using garbage bags please make sure that there are no holes in the bag and that the bag has been sealed above the dressing. If the bandage gets wet you must contact your surgeon's office to make arrangements to be seen to have the bandage changed.     Ice/Elevation:  The application of ice to your surgical site after surgery will help with pain control and swelling. This can be very effective for 48-72 hours after surgery. Please be careful to avoid getting bandage wet from a leaky ice bag. Please be advised that the ice may need to be applied for longer periods of time for the cooling effect to penetrate the post-operative dressing.     Elevation of the  operative site above the level of the heart as much as possible for the first 48-72 hours after surgery. Use your sling if you have been provided one while standing or walking. If your fingers are not included in the dressing you are encouraged to attempt finger range of motion as this will help with your hand swelling and ultimate recovery.    Pain Medication:  If you received a regional anesthetic on the day of your surgery your arm and hand may be numb for up to 24 hours after surgery. It is important to wear your sling while the block is still effective in order to protect your arm. It is advisable to take pain medications prior to going to sleep in case the regional anesthesia medication wears off while you are sleeping.    Take your pain medications as directed. Narcotic pain medications can cause lethargy, nausea and constipation. Please contact your surgeon's office and discontinue the medication if these symptoms become problematic. Eating a regular diet, drinking fluids and walking can help with constipation from these medications. Avoid alcohol consumption and driving while taking narcotic pain medications.     Additional pain control options:     You are encouraged to take over the counter medications like Advil / Motrin / Ibuprofen / Aleve in addition to your prescribed pain medications after surgery.    Smoking/Tobacco:  Tobacco use is known to interfere with wound and fracture healing and increase post-operative pain. It can also increase your risk of poor outcomes following surgery. Please do not use tobacco or nicotine products following your surgery. This includes smokeless tobacco, or nicotine replacement products (patches, gum, etc.).    Driving:  It is not advisable to operate a vehicle while using narcotic pain medications. Additionally, please be advised that you are likely to have challenges operating a car or motorcycle while you are still in your postoperative dressing and this could  increase your risk of being involved in an accident and being cited for driving while physically impaired.     Warning Signs:  Observe your arm/hand and incision site (if visible) for increased redness, inflammation, drainage, odor or pain that is unrelieved by rest, elevation or medication. Please contact your surgeon's office immediately if you develop any of these issues or if you develop a fever greater than 101°.    Follow Up Appointments:  Your post-operative appointment has been scheduled for 3/25/25 at 9 am    [facility], [address] [address], [place], [place], [address]       with patient

## 2025-04-17 ENCOUNTER — LABORATORY RESULT (OUTPATIENT)
Age: 75
End: 2025-04-17

## 2025-04-17 ENCOUNTER — APPOINTMENT (OUTPATIENT)
Dept: INTERNAL MEDICINE | Facility: CLINIC | Age: 75
End: 2025-04-17
Payer: MEDICARE

## 2025-04-17 PROCEDURE — 36415 COLL VENOUS BLD VENIPUNCTURE: CPT

## 2025-05-01 ENCOUNTER — APPOINTMENT (OUTPATIENT)
Dept: INTERNAL MEDICINE | Facility: CLINIC | Age: 75
End: 2025-05-01
Payer: MEDICARE

## 2025-05-01 VITALS
OXYGEN SATURATION: 98 % | HEIGHT: 64 IN | TEMPERATURE: 97.4 F | SYSTOLIC BLOOD PRESSURE: 165 MMHG | WEIGHT: 156 LBS | HEART RATE: 62 BPM | DIASTOLIC BLOOD PRESSURE: 77 MMHG | BODY MASS INDEX: 26.63 KG/M2

## 2025-05-01 DIAGNOSIS — E78.00 PURE HYPERCHOLESTEROLEMIA, UNSPECIFIED: ICD-10-CM

## 2025-05-01 DIAGNOSIS — I10 ESSENTIAL (PRIMARY) HYPERTENSION: ICD-10-CM

## 2025-05-01 DIAGNOSIS — M81.0 AGE-RELATED OSTEOPOROSIS W/OUT CURRENT PATHOLOGICAL FRACTURE: ICD-10-CM

## 2025-05-01 DIAGNOSIS — R26.89 OTHER ABNORMALITIES OF GAIT AND MOBILITY: ICD-10-CM

## 2025-05-01 PROCEDURE — 99213 OFFICE O/P EST LOW 20 MIN: CPT | Mod: 25

## 2025-05-01 PROCEDURE — 96372 THER/PROPH/DIAG INJ SC/IM: CPT

## 2025-05-01 RX ORDER — DENOSUMAB 60 MG/ML
60 INJECTION SUBCUTANEOUS
Qty: 0 | Refills: 0 | Status: COMPLETED | OUTPATIENT
Start: 2025-05-01

## 2025-05-01 RX ADMIN — DENOSUMAB 0 MG/ML: 60 INJECTION SUBCUTANEOUS at 00:00

## 2025-08-04 ENCOUNTER — APPOINTMENT (OUTPATIENT)
Dept: CARDIOLOGY | Facility: CLINIC | Age: 75
End: 2025-08-04

## 2025-08-11 ENCOUNTER — NON-APPOINTMENT (OUTPATIENT)
Age: 75
End: 2025-08-11

## 2025-08-13 ENCOUNTER — APPOINTMENT (OUTPATIENT)
Dept: CARDIOLOGY | Facility: CLINIC | Age: 75
End: 2025-08-13
Payer: MEDICARE

## 2025-08-13 VITALS
WEIGHT: 160.25 LBS | SYSTOLIC BLOOD PRESSURE: 158 MMHG | DIASTOLIC BLOOD PRESSURE: 88 MMHG | BODY MASS INDEX: 27.51 KG/M2 | OXYGEN SATURATION: 96 % | HEART RATE: 60 BPM

## 2025-08-13 DIAGNOSIS — D21.4 BENIGN NEOPLASM OF CONNECTIVE AND OTHER SOFT TISSUE OF ABDOMEN: ICD-10-CM

## 2025-08-13 DIAGNOSIS — G47.33 OBSTRUCTIVE SLEEP APNEA (ADULT) (PEDIATRIC): ICD-10-CM

## 2025-08-13 DIAGNOSIS — I48.0 PAROXYSMAL ATRIAL FIBRILLATION: ICD-10-CM

## 2025-08-13 DIAGNOSIS — E55.9 VITAMIN D DEFICIENCY, UNSPECIFIED: ICD-10-CM

## 2025-08-13 DIAGNOSIS — R03.0 ELEVATED BLOOD-PRESSURE READING, W/OUT DIAGNOSIS OF HYPERTENSION: ICD-10-CM

## 2025-08-13 DIAGNOSIS — I10 ESSENTIAL (PRIMARY) HYPERTENSION: ICD-10-CM

## 2025-08-13 PROCEDURE — 99214 OFFICE O/P EST MOD 30 MIN: CPT

## 2025-08-13 PROCEDURE — G2211 COMPLEX E/M VISIT ADD ON: CPT

## 2025-08-13 PROCEDURE — 99204 OFFICE O/P NEW MOD 45 MIN: CPT
